# Patient Record
Sex: FEMALE | Race: BLACK OR AFRICAN AMERICAN | NOT HISPANIC OR LATINO | URBAN - METROPOLITAN AREA
[De-identification: names, ages, dates, MRNs, and addresses within clinical notes are randomized per-mention and may not be internally consistent; named-entity substitution may affect disease eponyms.]

---

## 2020-03-28 ENCOUNTER — INPATIENT (INPATIENT)
Facility: HOSPITAL | Age: 80
LOS: 4 days | Discharge: HOME | DRG: 177 | End: 2020-04-02
Attending: INTERNAL MEDICINE | Admitting: HOSPITALIST
Payer: MEDICARE

## 2020-03-28 VITALS
OXYGEN SATURATION: 99 % | DIASTOLIC BLOOD PRESSURE: 65 MMHG | HEART RATE: 87 BPM | RESPIRATION RATE: 18 BRPM | TEMPERATURE: 100 F | HEIGHT: 63 IN | WEIGHT: 139.99 LBS | SYSTOLIC BLOOD PRESSURE: 95 MMHG

## 2020-03-28 DIAGNOSIS — J18.9 PNEUMONIA, UNSPECIFIED ORGANISM: ICD-10-CM

## 2020-03-28 DIAGNOSIS — Z78.9 OTHER SPECIFIED HEALTH STATUS: Chronic | ICD-10-CM

## 2020-03-28 LAB
ALBUMIN SERPL ELPH-MCNC: 3.7 G/DL — SIGNIFICANT CHANGE UP (ref 3.3–5.2)
ALP SERPL-CCNC: 86 U/L — SIGNIFICANT CHANGE UP (ref 40–120)
ALT FLD-CCNC: 53 U/L — HIGH
ANION GAP SERPL CALC-SCNC: 12 MMOL/L — SIGNIFICANT CHANGE UP (ref 5–17)
APPEARANCE UR: ABNORMAL
APTT BLD: 30.9 SEC — SIGNIFICANT CHANGE UP (ref 27.5–36.3)
AST SERPL-CCNC: 108 U/L — HIGH
BACTERIA # UR AUTO: ABNORMAL
BASOPHILS # BLD AUTO: 0 K/UL — SIGNIFICANT CHANGE UP (ref 0–0.2)
BASOPHILS NFR BLD AUTO: 0 % — SIGNIFICANT CHANGE UP (ref 0–2)
BILIRUB SERPL-MCNC: 0.7 MG/DL — SIGNIFICANT CHANGE UP (ref 0.4–2)
BILIRUB UR-MCNC: NEGATIVE — SIGNIFICANT CHANGE UP
BUN SERPL-MCNC: 26 MG/DL — HIGH (ref 8–20)
CALCIUM SERPL-MCNC: 9.2 MG/DL — SIGNIFICANT CHANGE UP (ref 8.6–10.2)
CHLORIDE SERPL-SCNC: 94 MMOL/L — LOW (ref 98–107)
CO2 SERPL-SCNC: 23 MMOL/L — SIGNIFICANT CHANGE UP (ref 22–29)
COLOR SPEC: YELLOW — SIGNIFICANT CHANGE UP
COMMENT - URINE: SIGNIFICANT CHANGE UP
CREAT SERPL-MCNC: 1.06 MG/DL — SIGNIFICANT CHANGE UP (ref 0.5–1.3)
CRP SERPL-MCNC: 8.85 MG/DL — HIGH (ref 0–0.4)
DACRYOCYTES BLD QL SMEAR: SLIGHT — SIGNIFICANT CHANGE UP
DIFF PNL FLD: ABNORMAL
ELLIPTOCYTES BLD QL SMEAR: SLIGHT — SIGNIFICANT CHANGE UP
EOSINOPHIL # BLD AUTO: 0 K/UL — SIGNIFICANT CHANGE UP (ref 0–0.5)
EOSINOPHIL NFR BLD AUTO: 0 % — SIGNIFICANT CHANGE UP (ref 0–6)
EPI CELLS # UR: SIGNIFICANT CHANGE UP
FERRITIN SERPL-MCNC: 686 NG/ML — HIGH (ref 15–150)
GIANT PLATELETS BLD QL SMEAR: PRESENT — SIGNIFICANT CHANGE UP
GLUCOSE SERPL-MCNC: 104 MG/DL — HIGH (ref 70–99)
GLUCOSE UR QL: NEGATIVE MG/DL — SIGNIFICANT CHANGE UP
GRAN CASTS # UR COMP ASSIST: ABNORMAL /LPF
HCT VFR BLD CALC: 48.7 % — HIGH (ref 34.5–45)
HGB BLD-MCNC: 16.2 G/DL — HIGH (ref 11.5–15.5)
INR BLD: 1.17 RATIO — HIGH (ref 0.88–1.16)
KETONES UR-MCNC: ABNORMAL
LACTATE BLDV-MCNC: 1.8 MMOL/L — SIGNIFICANT CHANGE UP (ref 0.5–2)
LDH SERPL L TO P-CCNC: 494 U/L — HIGH (ref 98–192)
LEUKOCYTE ESTERASE UR-ACNC: ABNORMAL
LYMPHOCYTES # BLD AUTO: 0.4 K/UL — LOW (ref 1–3.3)
LYMPHOCYTES # BLD AUTO: 5.5 % — LOW (ref 13–44)
MANUAL SMEAR VERIFICATION: SIGNIFICANT CHANGE UP
MCHC RBC-ENTMCNC: 30.6 PG — SIGNIFICANT CHANGE UP (ref 27–34)
MCHC RBC-ENTMCNC: 33.3 GM/DL — SIGNIFICANT CHANGE UP (ref 32–36)
MCV RBC AUTO: 92.1 FL — SIGNIFICANT CHANGE UP (ref 80–100)
MONOCYTES # BLD AUTO: 0.6 K/UL — SIGNIFICANT CHANGE UP (ref 0–0.9)
MONOCYTES NFR BLD AUTO: 8.3 % — SIGNIFICANT CHANGE UP (ref 2–14)
NEUTROPHILS # BLD AUTO: 6.24 K/UL — SIGNIFICANT CHANGE UP (ref 1.8–7.4)
NEUTROPHILS NFR BLD AUTO: 83.5 % — HIGH (ref 43–77)
NEUTS BAND # BLD: 2.7 % — SIGNIFICANT CHANGE UP (ref 0–8)
NITRITE UR-MCNC: NEGATIVE — SIGNIFICANT CHANGE UP
PH UR: 6 — SIGNIFICANT CHANGE UP (ref 5–8)
PLAT MORPH BLD: NORMAL — SIGNIFICANT CHANGE UP
PLATELET # BLD AUTO: 180 K/UL — SIGNIFICANT CHANGE UP (ref 150–400)
POTASSIUM SERPL-MCNC: 4.4 MMOL/L — SIGNIFICANT CHANGE UP (ref 3.5–5.3)
POTASSIUM SERPL-SCNC: 4.4 MMOL/L — SIGNIFICANT CHANGE UP (ref 3.5–5.3)
PROT SERPL-MCNC: 8.3 G/DL — SIGNIFICANT CHANGE UP (ref 6.6–8.7)
PROT UR-MCNC: 100 MG/DL
PROTHROM AB SERPL-ACNC: 13.3 SEC — HIGH (ref 10–12.9)
RBC # BLD: 5.29 M/UL — HIGH (ref 3.8–5.2)
RBC # FLD: 13.6 % — SIGNIFICANT CHANGE UP (ref 10.3–14.5)
RBC BLD AUTO: SIGNIFICANT CHANGE UP
RBC CASTS # UR COMP ASSIST: SIGNIFICANT CHANGE UP /HPF (ref 0–4)
SMUDGE CELLS # BLD: PRESENT — SIGNIFICANT CHANGE UP
SODIUM SERPL-SCNC: 129 MMOL/L — LOW (ref 135–145)
SP GR SPEC: 1.02 — SIGNIFICANT CHANGE UP (ref 1.01–1.02)
UROBILINOGEN FLD QL: 1 MG/DL
WBC # BLD: 7.24 K/UL — SIGNIFICANT CHANGE UP (ref 3.8–10.5)
WBC # FLD AUTO: 7.24 K/UL — SIGNIFICANT CHANGE UP (ref 3.8–10.5)
WBC UR QL: SIGNIFICANT CHANGE UP

## 2020-03-28 PROCEDURE — 93010 ELECTROCARDIOGRAM REPORT: CPT

## 2020-03-28 PROCEDURE — 71045 X-RAY EXAM CHEST 1 VIEW: CPT | Mod: 26

## 2020-03-28 PROCEDURE — 99223 1ST HOSP IP/OBS HIGH 75: CPT

## 2020-03-28 PROCEDURE — 99285 EMERGENCY DEPT VISIT HI MDM: CPT | Mod: GC

## 2020-03-28 RX ORDER — SODIUM CHLORIDE 9 MG/ML
1000 INJECTION INTRAMUSCULAR; INTRAVENOUS; SUBCUTANEOUS ONCE
Refills: 0 | Status: COMPLETED | OUTPATIENT
Start: 2020-03-28 | End: 2020-03-28

## 2020-03-28 RX ORDER — GABAPENTIN 400 MG/1
300 CAPSULE ORAL
Refills: 0 | Status: DISCONTINUED | OUTPATIENT
Start: 2020-03-28 | End: 2020-04-02

## 2020-03-28 RX ORDER — ASCORBIC ACID 60 MG
500 TABLET,CHEWABLE ORAL DAILY
Refills: 0 | Status: DISCONTINUED | OUTPATIENT
Start: 2020-03-28 | End: 2020-04-02

## 2020-03-28 RX ORDER — ACETAMINOPHEN 500 MG
650 TABLET ORAL ONCE
Refills: 0 | Status: COMPLETED | OUTPATIENT
Start: 2020-03-28 | End: 2020-03-28

## 2020-03-28 RX ORDER — ATENOLOL 25 MG/1
25 TABLET ORAL DAILY
Refills: 0 | Status: DISCONTINUED | OUTPATIENT
Start: 2020-03-28 | End: 2020-04-02

## 2020-03-28 RX ORDER — CEFTRIAXONE 500 MG/1
1000 INJECTION, POWDER, FOR SOLUTION INTRAMUSCULAR; INTRAVENOUS ONCE
Refills: 0 | Status: COMPLETED | OUTPATIENT
Start: 2020-03-28 | End: 2020-03-28

## 2020-03-28 RX ORDER — ENOXAPARIN SODIUM 100 MG/ML
40 INJECTION SUBCUTANEOUS EVERY 24 HOURS
Refills: 0 | Status: DISCONTINUED | OUTPATIENT
Start: 2020-03-28 | End: 2020-04-02

## 2020-03-28 RX ORDER — ATORVASTATIN CALCIUM 80 MG/1
80 TABLET, FILM COATED ORAL AT BEDTIME
Refills: 0 | Status: DISCONTINUED | OUTPATIENT
Start: 2020-03-28 | End: 2020-04-02

## 2020-03-28 RX ORDER — AZITHROMYCIN 500 MG/1
500 TABLET, FILM COATED ORAL ONCE
Refills: 0 | Status: COMPLETED | OUTPATIENT
Start: 2020-03-28 | End: 2020-03-28

## 2020-03-28 RX ORDER — CHOLECALCIFEROL (VITAMIN D3) 125 MCG
400 CAPSULE ORAL DAILY
Refills: 0 | Status: DISCONTINUED | OUTPATIENT
Start: 2020-03-28 | End: 2020-04-02

## 2020-03-28 RX ORDER — LEVOTHYROXINE SODIUM 125 MCG
75 TABLET ORAL DAILY
Refills: 0 | Status: DISCONTINUED | OUTPATIENT
Start: 2020-03-28 | End: 2020-04-02

## 2020-03-28 RX ADMIN — CEFTRIAXONE 100 MILLIGRAM(S): 500 INJECTION, POWDER, FOR SOLUTION INTRAMUSCULAR; INTRAVENOUS at 17:03

## 2020-03-28 RX ADMIN — Medication 650 MILLIGRAM(S): at 17:03

## 2020-03-28 RX ADMIN — AZITHROMYCIN 255 MILLIGRAM(S): 500 TABLET, FILM COATED ORAL at 17:36

## 2020-03-28 RX ADMIN — SODIUM CHLORIDE 1000 MILLILITER(S): 9 INJECTION INTRAMUSCULAR; INTRAVENOUS; SUBCUTANEOUS at 17:03

## 2020-03-28 NOTE — H&P ADULT - NSHPROSALLOTHERNEGRD_GEN_ALL_CORE
Phone call to patient, he states \" I know if I schedule something my wife will probably end up rescheduling it. Its best to schedule the procedure with her. You have permission to speak with Chelsie, my wife. I will have her call back tomorrow.\" patient was advised to have her call us to scheduled when she is available.    All other review of systems negative, except as noted in HPI

## 2020-03-28 NOTE — ED ADULT NURSE NOTE - NSIMPLEMENTINTERV_GEN_ALL_ED
Implemented All Fall Risk Interventions:  [place] to call system. Call bell, personal items and telephone within reach. Instruct patient to call for assistance. Room bathroom lighting operational. Non-slip footwear when patient is off stretcher. Physically safe environment: no spills, clutter or unnecessary equipment. Stretcher in lowest position, wheels locked, appropriate side rails in place. Provide visual cue, wrist band, yellow gown, etc. Monitor gait and stability. Monitor for mental status changes and reorient to person, place, and time. Review medications for side effects contributing to fall risk. Reinforce activity limits and safety measures with patient and family.

## 2020-03-28 NOTE — H&P ADULT - NSHPPHYSICALEXAM_GEN_ALL_CORE
T(C): 37.1 (03-28-20 @ 20:27), Max: 37.8 (03-28-20 @ 15:02)  HR: 73 (03-28-20 @ 20:27) (73 - 90)  BP: 123/56 (03-28-20 @ 20:27) (95/65 - 123/56)  RR: 17 (03-28-20 @ 20:27) (17 - 20)  SpO2: 99% (03-28-20 @ 20:27) (98% - 99%)    GENERAL: patient appears well, no acute distress, appropriate, pleasant  EYES: sclera clear, no exudates  ENMT: oropharynx clear without erythema, no exudates, moist mucous membranes  NECK: supple, soft, no thyromegaly noted  LUNGS: good air entry bilaterally, clear to auscultation, symmetric breath sounds, no wheezing or rhonchi appreciated  HEART: soft S1/S2, regular rate and rhythm, no murmurs noted, no lower extremity edema  GASTROINTESTINAL: abdomen is soft, nontender, nondistended, normoactive bowel sounds, no palpable masses  INTEGUMENT: good skin turgor, warm skin, appears well perfused  MUSCULOSKELETAL: no clubbing or cyanosis, no obvious deformity  NEUROLOGIC: awake, alert, oriented x3, good muscle tone in 4 extremities, no obvious sensory deficits  PSYCHIATRIC: mood is good, affect is congruent, linear and logical thought process  HEME/LYMPH: no palpable supraclavicular nodules, no obvious ecchymosis or petechiae

## 2020-03-28 NOTE — ED PROVIDER NOTE - OBJECTIVE STATEMENT
The patient is a 79 year old female presents with SOB, cough and fever and depressed mental status  No HA, No CP, No abd pain

## 2020-03-28 NOTE — ED PROVIDER NOTE - PROGRESS NOTE DETAILS
Stuart SOTO: Additional info obtained via phone from pt's grandson Pritesh.  He notes about 1 week of flu-like symptoms with fever and cough.  Over the past 3 days, pt has seemed more confused and less talkative.  When he visited today, she seemed lethargic.  He checked her SpO2 and found it to be 90% on RA when lying down, improved to 94-96% when sitting up.  Had temp of 100.1 at home.  Pt is normally very functional and independent.

## 2020-03-28 NOTE — H&P ADULT - ASSESSMENT
80yo F with pmh of HTN, HLD, Hypothyroidism presented to ED c/o generalized weakness with associated  Po intake, cough and sob for the past 1 week. Pt states she feel very weak, unable to ambulated and has decreased appetite and she got worse over the past few days, then her family dropped her off to ED today. On my encounter patient is AAOx3, able to answer all my questions, sating 96% on RA. Denies cp, sob, palpitations, n/v/d, abdominal pain. In the ED with low grade fever, transamititis, Na 129,Ferritin 686, CRP8.85, , lactate 1.8, cxr with no significant gross finding, no hypoxia.Pt received zithromax, ceftriaxone and 1L NS. 78yo F with pmh of HTN, HLD, Hypothyroidism presented to ED c/o generalized weakness with associated  Po intake, cough and sob for the past 1 week. Pt states she feel very weak, unable to ambulated and has decreased appetite and she got worse over the past few days, then her family dropped her off to ED today. On my encounter patient is AAOx3, able to answer all my questions, sating 96% on RA. Denies cp, sob, palpitations, n/v/d, abdominal pain. In the ED with low grade fever, transamititis, Na 129,Ferritin 686, CRP8.85, , lactate 1.8, cxr with no significant gross finding, no hypoxia. Pt received Zithromax, ceftriaxone and 1L NS.       Generalized weakness with fever, cough and sob r/o COVID-19  -Pending COVID result   -Admit to medical unit Monitor with   -Supplemental oxygen as needed to keep Sao2>94%  -Airborne and contact isolation  -f/u blood and urine culture  -Azithromycin and Rocephin IVPB stat does given in ED.   -Albuterol INH   -Consider ID Consult  -Please eval EKG for QTC  -F/U CBC, CMP  -started on Vit C, D    Hyponatremia  -likely due to dehydration   -s/p 1L NS int the ED, will hold off fluid for now given COVID r/o   -Hold HCTZ  -f/u am BMP     Hypertension  -labile bp   -cont with atenolol with holding parameter    HLD  -cont with atorvastatin    hypothyroidism  -cont with synthroid       DVT PPx  -cont with lovenox

## 2020-03-28 NOTE — ED PROVIDER NOTE - ATTENDING CONTRIBUTION TO CARE
The patient seen and examined    Pneumonia  Most likely COVID-19    I, Braden Beaver, performed the initial face to face bedside interview with this patient regarding history of present illness, review of symptoms and relevant past medical, social and family history.  I completed an independent physical examination.  I was the initial provider who evaluated this patient. I have signed out the follow up of any pending tests (i.e. labs, radiological studies) to the resident.  I have communicated the patient’s plan of care and disposition with the resident The patient seen and examined    Pneumonia  Most likely COVID-19    I, Braden Beaver, performed the initial face to face bedside interview with this patient regarding history of present illness, review of symptoms and relevant past medical, social and family history.  I completed an independent physical examination.  I was the initial provider who evaluated this patient. I have signed out the follow up of any pending tests (i.e. labs, radiological studies) to the resident.  I have communicated the patient’s plan of care and disposition with the resident.

## 2020-03-28 NOTE — ED ADULT TRIAGE NOTE - CHIEF COMPLAINT QUOTE
pt c/o fever, sob that started Monday and worsened, pt A&Ox2 oriented to self and placed as per grandson not at baseline pt c/o fever, sob, ams, and lethargic  that started Monday and worsened, pt A&Ox2 oriented to self and placed as per grandson not at baseline

## 2020-03-28 NOTE — H&P ADULT - HISTORY OF PRESENT ILLNESS
80yo F with pmh of HTN, HLD, Hypothyroidism presented to ED c/o generalized weakness with associated  Po intake, cough and sob for the past 1 week. Pt states she feel very weak, unable to ambulated and has decreased appetite and she got worse over the past few days, then her family dropped her off to ED today. On my encounter patient is AAOx3, able to answer all my questions, sating 96% on RA. Denies cp, sob, palpitations, n/v/d, abdominal pain. In the ED with low grade fever, transamititis, Na 129,Ferritin 686, CRP8.85, , lactate 1.8, cxr with no significant gross finding, no hypoxia.Pt received zithromax, ceftriaxone and 1L NS.

## 2020-03-28 NOTE — ED ADULT NURSE NOTE - CHIEF COMPLAINT QUOTE
pt c/o fever, sob, ams, and lethargic  that started Monday and worsened, pt A&Ox2 oriented to self and placed as per grandson not at baseline

## 2020-03-28 NOTE — ED PROVIDER NOTE - CLINICAL SUMMARY MEDICAL DECISION MAKING FREE TEXT BOX
The patient presents with cough, SOB and fever and generalized weakness and will admit for further evaluation

## 2020-03-28 NOTE — ED ADULT NURSE NOTE - OBJECTIVE STATEMENT
78yo female a&ox2 states "not feeling well" cannot elaborate more. states "sometimes I feel SOB" pt denies any fever, chills, cp, cough/congestion, n/v/d, urinary symptoms, recent sick contact or travel. no BLE edema

## 2020-03-29 LAB
ALBUMIN SERPL ELPH-MCNC: 3.4 G/DL — SIGNIFICANT CHANGE UP (ref 3.3–5.2)
ALP SERPL-CCNC: 80 U/L — SIGNIFICANT CHANGE UP (ref 40–120)
ALT FLD-CCNC: 52 U/L — HIGH
ANION GAP SERPL CALC-SCNC: 14 MMOL/L — SIGNIFICANT CHANGE UP (ref 5–17)
AST SERPL-CCNC: 99 U/L — HIGH
BASOPHILS # BLD AUTO: 0.01 K/UL — SIGNIFICANT CHANGE UP (ref 0–0.2)
BASOPHILS NFR BLD AUTO: 0.2 % — SIGNIFICANT CHANGE UP (ref 0–2)
BILIRUB DIRECT SERPL-MCNC: 0.2 MG/DL — SIGNIFICANT CHANGE UP (ref 0–0.3)
BILIRUB INDIRECT FLD-MCNC: 0.3 MG/DL — SIGNIFICANT CHANGE UP (ref 0.2–1)
BILIRUB SERPL-MCNC: 0.5 MG/DL — SIGNIFICANT CHANGE UP (ref 0.4–2)
BUN SERPL-MCNC: 17 MG/DL — SIGNIFICANT CHANGE UP (ref 8–20)
CALCIUM SERPL-MCNC: 8.9 MG/DL — SIGNIFICANT CHANGE UP (ref 8.6–10.2)
CHLORIDE SERPL-SCNC: 93 MMOL/L — LOW (ref 98–107)
CO2 SERPL-SCNC: 24 MMOL/L — SIGNIFICANT CHANGE UP (ref 22–29)
CREAT SERPL-MCNC: 0.88 MG/DL — SIGNIFICANT CHANGE UP (ref 0.5–1.3)
EOSINOPHIL # BLD AUTO: 0 K/UL — SIGNIFICANT CHANGE UP (ref 0–0.5)
EOSINOPHIL NFR BLD AUTO: 0 % — SIGNIFICANT CHANGE UP (ref 0–6)
GLUCOSE SERPL-MCNC: 86 MG/DL — SIGNIFICANT CHANGE UP (ref 70–99)
HCT VFR BLD CALC: 44.9 % — SIGNIFICANT CHANGE UP (ref 34.5–45)
HGB BLD-MCNC: 14.9 G/DL — SIGNIFICANT CHANGE UP (ref 11.5–15.5)
IMM GRANULOCYTES NFR BLD AUTO: 1 % — SIGNIFICANT CHANGE UP (ref 0–1.5)
LYMPHOCYTES # BLD AUTO: 0.89 K/UL — LOW (ref 1–3.3)
LYMPHOCYTES # BLD AUTO: 14.3 % — SIGNIFICANT CHANGE UP (ref 13–44)
MAGNESIUM SERPL-MCNC: 2.5 MG/DL — SIGNIFICANT CHANGE UP (ref 1.6–2.6)
MCHC RBC-ENTMCNC: 30.9 PG — SIGNIFICANT CHANGE UP (ref 27–34)
MCHC RBC-ENTMCNC: 33.2 GM/DL — SIGNIFICANT CHANGE UP (ref 32–36)
MCV RBC AUTO: 93.2 FL — SIGNIFICANT CHANGE UP (ref 80–100)
MONOCYTES # BLD AUTO: 0.32 K/UL — SIGNIFICANT CHANGE UP (ref 0–0.9)
MONOCYTES NFR BLD AUTO: 5.1 % — SIGNIFICANT CHANGE UP (ref 2–14)
NEUTROPHILS # BLD AUTO: 4.95 K/UL — SIGNIFICANT CHANGE UP (ref 1.8–7.4)
NEUTROPHILS NFR BLD AUTO: 79.4 % — HIGH (ref 43–77)
PHOSPHATE SERPL-MCNC: 2.4 MG/DL — SIGNIFICANT CHANGE UP (ref 2.4–4.7)
PLATELET # BLD AUTO: 172 K/UL — SIGNIFICANT CHANGE UP (ref 150–400)
POTASSIUM SERPL-MCNC: 4.7 MMOL/L — SIGNIFICANT CHANGE UP (ref 3.5–5.3)
POTASSIUM SERPL-SCNC: 4.7 MMOL/L — SIGNIFICANT CHANGE UP (ref 3.5–5.3)
PROT SERPL-MCNC: 7.2 G/DL — SIGNIFICANT CHANGE UP (ref 6.6–8.7)
RBC # BLD: 4.82 M/UL — SIGNIFICANT CHANGE UP (ref 3.8–5.2)
RBC # FLD: 13.5 % — SIGNIFICANT CHANGE UP (ref 10.3–14.5)
SARS-COV-2 RNA SPEC QL NAA+PROBE: DETECTED
SODIUM SERPL-SCNC: 131 MMOL/L — LOW (ref 135–145)
WBC # BLD: 6.23 K/UL — SIGNIFICANT CHANGE UP (ref 3.8–10.5)
WBC # FLD AUTO: 6.23 K/UL — SIGNIFICANT CHANGE UP (ref 3.8–10.5)

## 2020-03-29 PROCEDURE — 99232 SBSQ HOSP IP/OBS MODERATE 35: CPT

## 2020-03-29 PROCEDURE — 93010 ELECTROCARDIOGRAM REPORT: CPT

## 2020-03-29 RX ORDER — HYDROXYCHLOROQUINE SULFATE 200 MG
TABLET ORAL
Refills: 0 | Status: DISCONTINUED | OUTPATIENT
Start: 2020-03-29 | End: 2020-03-29

## 2020-03-29 RX ORDER — ACETAMINOPHEN 500 MG
650 TABLET ORAL EVERY 6 HOURS
Refills: 0 | Status: DISCONTINUED | OUTPATIENT
Start: 2020-03-29 | End: 2020-04-02

## 2020-03-29 RX ADMIN — GABAPENTIN 300 MILLIGRAM(S): 400 CAPSULE ORAL at 06:47

## 2020-03-29 RX ADMIN — Medication 650 MILLIGRAM(S): at 15:56

## 2020-03-29 RX ADMIN — ATORVASTATIN CALCIUM 80 MILLIGRAM(S): 80 TABLET, FILM COATED ORAL at 21:26

## 2020-03-29 RX ADMIN — ENOXAPARIN SODIUM 40 MILLIGRAM(S): 100 INJECTION SUBCUTANEOUS at 07:10

## 2020-03-29 RX ADMIN — Medication 650 MILLIGRAM(S): at 08:15

## 2020-03-29 RX ADMIN — ATENOLOL 25 MILLIGRAM(S): 25 TABLET ORAL at 06:46

## 2020-03-29 RX ADMIN — Medication 500 MILLIGRAM(S): at 11:56

## 2020-03-29 RX ADMIN — Medication 400 UNIT(S): at 11:56

## 2020-03-29 RX ADMIN — Medication 650 MILLIGRAM(S): at 16:38

## 2020-03-29 RX ADMIN — Medication 75 MICROGRAM(S): at 06:47

## 2020-03-29 RX ADMIN — GABAPENTIN 300 MILLIGRAM(S): 400 CAPSULE ORAL at 18:11

## 2020-03-29 NOTE — PROGRESS NOTE ADULT - SUBJECTIVE AND OBJECTIVE BOX
Patient: ESTELLA FALK [id number] 79y Female                           Internal Medicine Hospitalist Progress Note    Pt still c/o mild fatigue and SOB.  Tm 102.  Not on supplemental O2.   No additional complaints.     ____________________PHYSICAL EXAM:  GENERAL:  NAD Alert and Oriented x 3   HEENT: NCAT  CARDIOVASCULAR:  S1, S2  LUNGS: CTAB  ABDOMEN:  soft, (-) tenderness, (-) distension, (+) bowel sounds, (-) guarding, (-) rebound (-) rigidity  EXTREMITIES:  no cyanosis / clubbing / edema.   ____________________     VITALS:  Vital Signs Last 24 Hrs  T(C): 38.9 (29 Mar 2020 13:24), Max: 38.9 (29 Mar 2020 13:24)  T(F): 102 (29 Mar 2020 13:24), Max: 102 (29 Mar 2020 13:24)  HR: 74 (29 Mar 2020 13:24) (73 - 90)  BP: 137/80 (29 Mar 2020 13:24) (105/55 - 137/80)  BP(mean): --  RR: 18 (29 Mar 2020 13:24) (17 - 20)  SpO2: 99% (29 Mar 2020 13:24) (95% - 99%) Daily     Daily   CAPILLARY BLOOD GLUCOSE        I&O's Summary        BACKGROUND:  HEALTH ISSUES - PROBLEM Dx:        Allergies    No Known Allergies    Intolerances      PAST MEDICAL & SURGICAL HISTORY:  Hypothyroid  HLD (hyperlipidemia)  HTN (hypertension)  No pertinent past surgical history        LABS:                        14.9   6.23  )-----------( 172      ( 29 Mar 2020 11:20 )             44.9         131<L>  |  93<L>  |  17.0  ----------------------------<  86  4.7   |  24.0  |  0.88    Ca    8.9      29 Mar 2020 11:20  Phos  2.4       Mg     2.5         TPro  7.2  /  Alb  3.4  /  TBili  0.5  /  DBili  0.2  /  AST  99<H>  /  ALT  52<H>  /  AlkPhos  80      PT/INR - ( 28 Mar 2020 17:27 )   PT: 13.3 sec;   INR: 1.17 ratio         PTT - ( 28 Mar 2020 17:27 )  PTT:30.9 sec  LIVER FUNCTIONS - ( 29 Mar 2020 11:20 )  Alb: 3.4 g/dL / Pro: 7.2 g/dL / ALK PHOS: 80 U/L / ALT: 52 U/L / AST: 99 U/L / GGT: x           Urinalysis Basic - ( 28 Mar 2020 21:16 )    Color: Yellow / Appearance: Slightly Turbid / S.020 / pH: x  Gluc: x / Ketone: Small  / Bili: Negative / Urobili: 1 mg/dL   Blood: x / Protein: 100 mg/dL / Nitrite: Negative   Leuk Esterase: Trace / RBC: 0-2 /HPF / WBC 3-5   Sq Epi: x / Non Sq Epi: Occasional / Bacteria: Occasional              MEDICATIONS:  MEDICATIONS  (STANDING):  ascorbic acid 500 milliGRAM(s) Oral daily  ATENolol  Tablet 25 milliGRAM(s) Oral daily  atorvastatin 80 milliGRAM(s) Oral at bedtime  cholecalciferol 400 Unit(s) Oral daily  enoxaparin Injectable 40 milliGRAM(s) SubCutaneous every 24 hours  gabapentin 300 milliGRAM(s) Oral two times a day  levothyroxine 75 MICROGram(s) Oral daily    MEDICATIONS  (PRN):  acetaminophen   Tablet .. 650 milliGRAM(s) Oral every 6 hours PRN Temp greater or equal to 38.5C (101.3F)

## 2020-03-29 NOTE — PROGRESS NOTE ADULT - ASSESSMENT
78yo F with pmh of HTN, HLD, Hypothyroidism presented to ED c/o generalized weakness with associated  Po intake, cough and sob for the past 1 week. Pt states she feel very weak, unable to ambulated and has decreased appetite and she got worse over the past few days, then her family dropped her off to ED.  COVID positive.      # Viral Pneumonia, Confirmed Covid-19 infection - Airborne precautions.  Symptom management.  Albuterol HFA, antitussives.  Tylenol prn fever.  Supplemental O2.  Monitor SaO2.  Discussed trial of prone positioning.  Avoid nebulizers / high flow O2.  In this unprecedented setting of limited ICU availability, observe hypoxia with continuous SaO2.  If hypoxia significantly worsens despite 100% FIO2, would consider ICU evaluation.    Discussed with patient to refer close contacts and CDC guidelines regarding self-quarantine and isolation precautions.  Not hypoxic.  Will monitor off Plaquenil for now.  # Hyponatremia - appears to be hypovolemic.  S/p IVF.  repeat BMp in am.  # Essential HTN - Monitor BP.  Continue oral antihypertensives.  # Hyperlipidemia - diet modification.  Continue Statin.  # Acquired Hypothyroidism - continue Synthroid.  # DVT Prophylaxis - Lovenox subcut

## 2020-03-30 LAB
ALBUMIN SERPL ELPH-MCNC: 2.9 G/DL — LOW (ref 3.3–5.2)
ALP SERPL-CCNC: 80 U/L — SIGNIFICANT CHANGE UP (ref 40–120)
ALT FLD-CCNC: 55 U/L — HIGH
ANION GAP SERPL CALC-SCNC: 15 MMOL/L — SIGNIFICANT CHANGE UP (ref 5–17)
AST SERPL-CCNC: 107 U/L — HIGH
BASOPHILS # BLD AUTO: 0.01 K/UL — SIGNIFICANT CHANGE UP (ref 0–0.2)
BASOPHILS NFR BLD AUTO: 0.1 % — SIGNIFICANT CHANGE UP (ref 0–2)
BILIRUB SERPL-MCNC: 0.6 MG/DL — SIGNIFICANT CHANGE UP (ref 0.4–2)
BUN SERPL-MCNC: 16 MG/DL — SIGNIFICANT CHANGE UP (ref 8–20)
CALCIUM SERPL-MCNC: 8.8 MG/DL — SIGNIFICANT CHANGE UP (ref 8.6–10.2)
CHLORIDE SERPL-SCNC: 96 MMOL/L — LOW (ref 98–107)
CO2 SERPL-SCNC: 21 MMOL/L — LOW (ref 22–29)
CREAT SERPL-MCNC: 0.66 MG/DL — SIGNIFICANT CHANGE UP (ref 0.5–1.3)
CULTURE RESULTS: SIGNIFICANT CHANGE UP
EOSINOPHIL # BLD AUTO: 0.01 K/UL — SIGNIFICANT CHANGE UP (ref 0–0.5)
EOSINOPHIL NFR BLD AUTO: 0.1 % — SIGNIFICANT CHANGE UP (ref 0–6)
GLUCOSE SERPL-MCNC: 96 MG/DL — SIGNIFICANT CHANGE UP (ref 70–99)
HCT VFR BLD CALC: 48.7 % — HIGH (ref 34.5–45)
HGB BLD-MCNC: 15.9 G/DL — HIGH (ref 11.5–15.5)
IMM GRANULOCYTES NFR BLD AUTO: 1 % — SIGNIFICANT CHANGE UP (ref 0–1.5)
LYMPHOCYTES # BLD AUTO: 1.23 K/UL — SIGNIFICANT CHANGE UP (ref 1–3.3)
LYMPHOCYTES # BLD AUTO: 17.2 % — SIGNIFICANT CHANGE UP (ref 13–44)
MCHC RBC-ENTMCNC: 31.1 PG — SIGNIFICANT CHANGE UP (ref 27–34)
MCHC RBC-ENTMCNC: 32.6 GM/DL — SIGNIFICANT CHANGE UP (ref 32–36)
MCV RBC AUTO: 95.1 FL — SIGNIFICANT CHANGE UP (ref 80–100)
MONOCYTES # BLD AUTO: 0.42 K/UL — SIGNIFICANT CHANGE UP (ref 0–0.9)
MONOCYTES NFR BLD AUTO: 5.9 % — SIGNIFICANT CHANGE UP (ref 2–14)
NEUTROPHILS # BLD AUTO: 5.4 K/UL — SIGNIFICANT CHANGE UP (ref 1.8–7.4)
NEUTROPHILS NFR BLD AUTO: 75.7 % — SIGNIFICANT CHANGE UP (ref 43–77)
PLATELET # BLD AUTO: 162 K/UL — SIGNIFICANT CHANGE UP (ref 150–400)
POTASSIUM SERPL-MCNC: 5.1 MMOL/L — SIGNIFICANT CHANGE UP (ref 3.5–5.3)
POTASSIUM SERPL-SCNC: 5.1 MMOL/L — SIGNIFICANT CHANGE UP (ref 3.5–5.3)
PROT SERPL-MCNC: 7.4 G/DL — SIGNIFICANT CHANGE UP (ref 6.6–8.7)
RBC # BLD: 5.12 M/UL — SIGNIFICANT CHANGE UP (ref 3.8–5.2)
RBC # FLD: 13.7 % — SIGNIFICANT CHANGE UP (ref 10.3–14.5)
SODIUM SERPL-SCNC: 132 MMOL/L — LOW (ref 135–145)
SPECIMEN SOURCE: SIGNIFICANT CHANGE UP
WBC # BLD: 7.14 K/UL — SIGNIFICANT CHANGE UP (ref 3.8–10.5)
WBC # FLD AUTO: 7.14 K/UL — SIGNIFICANT CHANGE UP (ref 3.8–10.5)

## 2020-03-30 PROCEDURE — 99232 SBSQ HOSP IP/OBS MODERATE 35: CPT

## 2020-03-30 RX ORDER — GABAPENTIN 400 MG/1
1 CAPSULE ORAL
Qty: 0 | Refills: 0 | DISCHARGE

## 2020-03-30 RX ORDER — OMEPRAZOLE 10 MG/1
1 CAPSULE, DELAYED RELEASE ORAL
Qty: 0 | Refills: 0 | DISCHARGE

## 2020-03-30 RX ORDER — HYDROXYCHLOROQUINE SULFATE 200 MG
TABLET ORAL
Refills: 0 | Status: DISCONTINUED | OUTPATIENT
Start: 2020-03-30 | End: 2020-04-02

## 2020-03-30 RX ORDER — LEVOTHYROXINE SODIUM 125 MCG
1 TABLET ORAL
Qty: 0 | Refills: 0 | DISCHARGE

## 2020-03-30 RX ORDER — FOSINOPRIL SODIUM 10 MG/1
1 TABLET ORAL
Qty: 0 | Refills: 0 | DISCHARGE

## 2020-03-30 RX ORDER — HYDROXYCHLOROQUINE SULFATE 200 MG
400 TABLET ORAL EVERY 12 HOURS
Refills: 0 | Status: COMPLETED | OUTPATIENT
Start: 2020-03-30 | End: 2020-03-31

## 2020-03-30 RX ORDER — HYDROXYCHLOROQUINE SULFATE 200 MG
200 TABLET ORAL EVERY 12 HOURS
Refills: 0 | Status: DISCONTINUED | OUTPATIENT
Start: 2020-03-31 | End: 2020-04-02

## 2020-03-30 RX ORDER — ATENOLOL 25 MG/1
1 TABLET ORAL
Qty: 0 | Refills: 0 | DISCHARGE

## 2020-03-30 RX ORDER — ATORVASTATIN CALCIUM 80 MG/1
1 TABLET, FILM COATED ORAL
Qty: 0 | Refills: 0 | DISCHARGE

## 2020-03-30 RX ADMIN — Medication 400 UNIT(S): at 11:37

## 2020-03-30 RX ADMIN — GABAPENTIN 300 MILLIGRAM(S): 400 CAPSULE ORAL at 18:01

## 2020-03-30 RX ADMIN — ENOXAPARIN SODIUM 40 MILLIGRAM(S): 100 INJECTION SUBCUTANEOUS at 05:23

## 2020-03-30 RX ADMIN — ATORVASTATIN CALCIUM 80 MILLIGRAM(S): 80 TABLET, FILM COATED ORAL at 22:48

## 2020-03-30 RX ADMIN — Medication 500 MILLIGRAM(S): at 11:37

## 2020-03-30 RX ADMIN — Medication 400 MILLIGRAM(S): at 18:01

## 2020-03-30 RX ADMIN — Medication 75 MICROGRAM(S): at 05:23

## 2020-03-30 RX ADMIN — GABAPENTIN 300 MILLIGRAM(S): 400 CAPSULE ORAL at 05:24

## 2020-03-30 NOTE — PROGRESS NOTE ADULT - ASSESSMENT
78yo F with pmh of HTN, HLD, Hypothyroidism presented to ED c/o generalized weakness with associated  Po intake, cough and sob for the past 1 week. Pt states she feel very weak, unable to ambulated and has decreased appetite and she got worse over the past few days, then her family dropped her off to ED today. On my encounter patient is AAOx3, able to answer all my questions, sating 96% on RA. Denies cp, sob, palpitations, n/v/d, abdominal pain. In the ED with low grade fever, transamititis, Na 129,Ferritin 686, CRP8.85, , lactate 1.8, cxr with no significant gross finding, no hypoxia. Pt received Zithromax, ceftriaxone and 1L NS.  COVID positive      # Viral Pneumonia,   Covid-19 infection - Airborne precautions.  Symptom management.  Albuterol HFA, antitussives.  Tylenol prn fever.  Supplemental O2.  Monitor SaO2.  Trial of prone positioning.  Avoid nebulizers / high flow O2.  In this unprecedented setting of extremely limited ICU availability, observe hypoxia on continuous SaO2.  If pt develops SaO2 < 90% even on 100% FIO2, would consider ICU evaluation.   - Discussed with patient to refer close contacts and CDC guidelines regarding self-quarantine and isolation precautions.  - Plaquenil  # Acute Hypoxic Respiratory Failure - see above.   # Hyponatremia -likely due to dehydration.  S/p IVF.  Tolerating po intake.  Na improving.  Repeat BMP in am.  # Hypertension -labile bp  -cont with atenolol with holding parameter  # HLD -cont with atorvastatin  # Hypothyroidism -cont with synthroid     DVT PPx -cont with lovenox 80yo F with pmh of HTN, HLD, Hypothyroidism presented to ED c/o generalized weakness with associated  Po intake, cough and sob for the past 1 week. Pt states she feel very weak, unable to ambulated and has decreased appetite and she got worse over the past few days, then her family dropped her off to ED today. On my encounter patient is AAOx3, able to answer all my questions, sating 96% on RA. Denies cp, sob, palpitations, n/v/d, abdominal pain. In the ED with low grade fever, transamititis, Na 129,Ferritin 686, CRP8.85, , lactate 1.8, cxr with no significant gross finding, no hypoxia. Pt received Zithromax, ceftriaxone and 1L NS.  COVID positive      # Viral Pneumonia,   Covid-19 infection - Airborne precautions.  Symptom management.  Albuterol HFA, antitussives.  Tylenol prn fever.  Supplemental O2.  Monitor SaO2.  Trial of prone positioning.  Avoid nebulizers / high flow O2.  In this unprecedented setting of extremely limited ICU availability, observe hypoxia on continuous SaO2.  If pt develops SaO2 < 90% even on 100% FIO2, would consider ICU evaluation.   - Discussed with patient to refer close contacts and CDC guidelines regarding self-quarantine and isolation precautions.  - Plaquenil - will need to monitor QTc. 	  # Acute Hypoxic Respiratory Failure - see above.   # Hyponatremia -likely due to dehydration.  S/p IVF.  Tolerating po intake.  Na improving.  Repeat BMP in am.  # Hypertension -labile bp  -cont with atenolol with holding parameter  # HLD -cont with atorvastatin  # Hypothyroidism -cont with synthroid     DVT PPx -cont with lovenox

## 2020-03-30 NOTE — PROGRESS NOTE ADULT - SUBJECTIVE AND OBJECTIVE BOX
Patient: ESTELLA FALK [id number] 79y Female                           Internal Medicine Hospitalist Progress Note    Reports SOB appears to be unchanged.  On NC.  Tm 99.5.  Mild cough, fatigue.    ____________________PHYSICAL EXAM:  GENERAL:  NAD Alert and Oriented x 3   HEENT: NCAT  CARDIOVASCULAR:  S1, S2  LUNGS: coarse BS b/l  ABDOMEN:  soft, (-) tenderness, (-) distension, (+) bowel sounds, (-) guarding, (-) rebound (-) rigidity  EXTREMITIES:  no cyanosis / clubbing / edema.   ____________________     VITALS:  Vital Signs Last 24 Hrs  T(C): 36.9 (30 Mar 2020 08:00), Max: 37.5 (29 Mar 2020 23:33)  T(F): 98.4 (30 Mar 2020 08:00), Max: 99.5 (29 Mar 2020 23:33)  HR: 72 (30 Mar 2020 08:00) (67 - 107)  BP: 112/64 (30 Mar 2020 08:00) (102/69 - 132/68)  BP(mean): --  RR: 14 (30 Mar 2020 08:00) (14 - 18)  SpO2: 94% (30 Mar 2020 08:00) (90% - 97%) Daily     Daily   CAPILLARY BLOOD GLUCOSE        I&O's Summary    29 Mar 2020 07:01  -  30 Mar 2020 07:00  --------------------------------------------------------  IN: 120 mL / OUT: 0 mL / NET: 120 mL          BACKGROUND:  HEALTH ISSUES - PROBLEM Dx:        Allergies    No Known Allergies    Intolerances      PAST MEDICAL & SURGICAL HISTORY:  Hypothyroid  HLD (hyperlipidemia)  HTN (hypertension)  No pertinent past surgical history        LABS:                        15.9   7.14  )-----------( 162      ( 30 Mar 2020 05:55 )             48.7       Culture - Urine (collected 29 Mar 2020 02:03)  Source: .Urine  Final Report (30 Mar 2020 09:28):    <10,000 CFU/mL Normal Urogenital Marcella    03-30    132<L>  |  96<L>  |  16.0  ----------------------------<  96  5.1   |  21.0<L>  |  0.66    Ca    8.8      30 Mar 2020 05:55  Phos  2.4     03-  Mg     2.5         TPro  7.4  /  Alb  2.9<L>  /  TBili  0.6  /  DBili  x   /  AST  107<H>  /  ALT  55<H>  /  AlkPhos  80  03-30    PT/INR - ( 28 Mar 2020 17:27 )   PT: 13.3 sec;   INR: 1.17 ratio         PTT - ( 28 Mar 2020 17:27 )  PTT:30.9 sec  LIVER FUNCTIONS - ( 30 Mar 2020 05:55 )  Alb: 2.9 g/dL / Pro: 7.4 g/dL / ALK PHOS: 80 U/L / ALT: 55 U/L / AST: 107 U/L / GGT: x           Urinalysis Basic - ( 28 Mar 2020 21:16 )    Color: Yellow / Appearance: Slightly Turbid / S.020 / pH: x  Gluc: x / Ketone: Small  / Bili: Negative / Urobili: 1 mg/dL   Blood: x / Protein: 100 mg/dL / Nitrite: Negative   Leuk Esterase: Trace / RBC: 0-2 /HPF / WBC 3-5   Sq Epi: x / Non Sq Epi: Occasional / Bacteria: Occasional          Culture - Urine (collected 29 Mar 2020 02:03)  Source: .Urine  Final Report (30 Mar 2020 09:28):    <10,000 CFU/mL Normal Urogenital Marcella          MEDICATIONS:  MEDICATIONS  (STANDING):  ascorbic acid 500 milliGRAM(s) Oral daily  ATENolol  Tablet 25 milliGRAM(s) Oral daily  atorvastatin 80 milliGRAM(s) Oral at bedtime  cholecalciferol 400 Unit(s) Oral daily  enoxaparin Injectable 40 milliGRAM(s) SubCutaneous every 24 hours  gabapentin 300 milliGRAM(s) Oral two times a day  levothyroxine 75 MICROGram(s) Oral daily    MEDICATIONS  (PRN):  acetaminophen   Tablet .. 650 milliGRAM(s) Oral every 6 hours PRN Temp greater or equal to 38.5C (101.3F)

## 2020-03-31 LAB
ALBUMIN SERPL ELPH-MCNC: 3.1 G/DL — LOW (ref 3.3–5.2)
ALP SERPL-CCNC: 98 U/L — SIGNIFICANT CHANGE UP (ref 40–120)
ALT FLD-CCNC: 72 U/L — HIGH
ANION GAP SERPL CALC-SCNC: 15 MMOL/L — SIGNIFICANT CHANGE UP (ref 5–17)
AST SERPL-CCNC: 102 U/L — HIGH
BASOPHILS # BLD AUTO: 0.01 K/UL — SIGNIFICANT CHANGE UP (ref 0–0.2)
BASOPHILS NFR BLD AUTO: 0.1 % — SIGNIFICANT CHANGE UP (ref 0–2)
BILIRUB SERPL-MCNC: 0.7 MG/DL — SIGNIFICANT CHANGE UP (ref 0.4–2)
BUN SERPL-MCNC: 13 MG/DL — SIGNIFICANT CHANGE UP (ref 8–20)
CALCIUM SERPL-MCNC: 9.1 MG/DL — SIGNIFICANT CHANGE UP (ref 8.6–10.2)
CHLORIDE SERPL-SCNC: 95 MMOL/L — LOW (ref 98–107)
CO2 SERPL-SCNC: 24 MMOL/L — SIGNIFICANT CHANGE UP (ref 22–29)
CREAT SERPL-MCNC: 0.68 MG/DL — SIGNIFICANT CHANGE UP (ref 0.5–1.3)
EOSINOPHIL # BLD AUTO: 0.01 K/UL — SIGNIFICANT CHANGE UP (ref 0–0.5)
EOSINOPHIL NFR BLD AUTO: 0.1 % — SIGNIFICANT CHANGE UP (ref 0–6)
GLUCOSE SERPL-MCNC: 112 MG/DL — HIGH (ref 70–99)
HCT VFR BLD CALC: 45.3 % — HIGH (ref 34.5–45)
HGB BLD-MCNC: 14.9 G/DL — SIGNIFICANT CHANGE UP (ref 11.5–15.5)
IMM GRANULOCYTES NFR BLD AUTO: 1.2 % — SIGNIFICANT CHANGE UP (ref 0–1.5)
LYMPHOCYTES # BLD AUTO: 1.13 K/UL — SIGNIFICANT CHANGE UP (ref 1–3.3)
LYMPHOCYTES # BLD AUTO: 15.2 % — SIGNIFICANT CHANGE UP (ref 13–44)
MCHC RBC-ENTMCNC: 30.8 PG — SIGNIFICANT CHANGE UP (ref 27–34)
MCHC RBC-ENTMCNC: 32.9 GM/DL — SIGNIFICANT CHANGE UP (ref 32–36)
MCV RBC AUTO: 93.6 FL — SIGNIFICANT CHANGE UP (ref 80–100)
MONOCYTES # BLD AUTO: 0.3 K/UL — SIGNIFICANT CHANGE UP (ref 0–0.9)
MONOCYTES NFR BLD AUTO: 4 % — SIGNIFICANT CHANGE UP (ref 2–14)
NEUTROPHILS # BLD AUTO: 5.91 K/UL — SIGNIFICANT CHANGE UP (ref 1.8–7.4)
NEUTROPHILS NFR BLD AUTO: 79.4 % — HIGH (ref 43–77)
PLATELET # BLD AUTO: 277 K/UL — SIGNIFICANT CHANGE UP (ref 150–400)
POTASSIUM SERPL-MCNC: 4.1 MMOL/L — SIGNIFICANT CHANGE UP (ref 3.5–5.3)
POTASSIUM SERPL-SCNC: 4.1 MMOL/L — SIGNIFICANT CHANGE UP (ref 3.5–5.3)
PROT SERPL-MCNC: 7.5 G/DL — SIGNIFICANT CHANGE UP (ref 6.6–8.7)
RBC # BLD: 4.84 M/UL — SIGNIFICANT CHANGE UP (ref 3.8–5.2)
RBC # FLD: 13.7 % — SIGNIFICANT CHANGE UP (ref 10.3–14.5)
SODIUM SERPL-SCNC: 134 MMOL/L — LOW (ref 135–145)
WBC # BLD: 7.45 K/UL — SIGNIFICANT CHANGE UP (ref 3.8–10.5)
WBC # FLD AUTO: 7.45 K/UL — SIGNIFICANT CHANGE UP (ref 3.8–10.5)

## 2020-03-31 PROCEDURE — 99232 SBSQ HOSP IP/OBS MODERATE 35: CPT

## 2020-03-31 RX ADMIN — Medication 400 UNIT(S): at 10:35

## 2020-03-31 RX ADMIN — ATORVASTATIN CALCIUM 80 MILLIGRAM(S): 80 TABLET, FILM COATED ORAL at 22:39

## 2020-03-31 RX ADMIN — Medication 200 MILLIGRAM(S): at 17:25

## 2020-03-31 RX ADMIN — Medication 500 MILLIGRAM(S): at 10:35

## 2020-03-31 RX ADMIN — GABAPENTIN 300 MILLIGRAM(S): 400 CAPSULE ORAL at 05:19

## 2020-03-31 RX ADMIN — ENOXAPARIN SODIUM 40 MILLIGRAM(S): 100 INJECTION SUBCUTANEOUS at 05:19

## 2020-03-31 RX ADMIN — Medication 400 MILLIGRAM(S): at 05:19

## 2020-03-31 RX ADMIN — Medication 75 MICROGRAM(S): at 05:19

## 2020-03-31 NOTE — PROGRESS NOTE ADULT - SUBJECTIVE AND OBJECTIVE BOX
Patient: ESTELLA FALK [id number] 79y Female                           Internal Medicine Hospitalist Progress Note    She continues to c/o SOB.  Remains on O2 via NC  No fevers noted.      ____________________PHYSICAL EXAM:  GENERAL:  NAD Alert and Oriented x 3   HEENT: NCAT  CARDIOVASCULAR:  S1, S2  LUNGS: coarse BS b/l  ABDOMEN:  soft, (-) tenderness, (-) distension, (+) bowel sounds, (-) guarding, (-) rebound (-) rigidity  EXTREMITIES:  no cyanosis / clubbing / edema.   ____________________    VITALS:  Vital Signs Last 24 Hrs  T(C): 36.4 (31 Mar 2020 09:00), Max: 37.2 (31 Mar 2020 04:18)  T(F): 97.6 (31 Mar 2020 09:00), Max: 99 (31 Mar 2020 04:18)  HR: 78 (31 Mar 2020 09:00) (73 - 85)  BP: 116/80 (31 Mar 2020 09:00) (100/68 - 116/80)  BP(mean): --  RR: 19 (31 Mar 2020 09:00) (19 - 24)  SpO2: 97% (31 Mar 2020 09:00) (96% - 99%) Daily     Daily   CAPILLARY BLOOD GLUCOSE        I&O's Summary      LABS:                        14.9   7.45  )-----------( 277      ( 31 Mar 2020 14:39 )             45.3     03-31    134<L>  |  95<L>  |  13.0  ----------------------------<  112<H>  4.1   |  24.0  |  0.68    Ca    9.1      31 Mar 2020 14:39    TPro  7.5  /  Alb  3.1<L>  /  TBili  0.7  /  DBili  x   /  AST  102<H>  /  ALT  72<H>  /  AlkPhos  98  03-31      LIVER FUNCTIONS - ( 31 Mar 2020 14:39 )  Alb: 3.1 g/dL / Pro: 7.5 g/dL / ALK PHOS: 98 U/L / ALT: 72 U/L / AST: 102 U/L / GGT: x                   Culture - Urine (collected 29 Mar 2020 02:03)  Source: .Urine  Final Report (30 Mar 2020 09:28):    <10,000 CFU/mL Normal Urogenital Marcella    Culture - Blood (collected 28 Mar 2020 17:21)  Source: .Blood  Preliminary Report (30 Mar 2020 19:01):    No growth at 48 hours    Culture - Blood (collected 28 Mar 2020 17:21)  Source: .Blood  Preliminary Report (30 Mar 2020 19:01):    No growth at 48 hours        MEDICATIONS:  acetaminophen   Tablet .. 650 milliGRAM(s) Oral every 6 hours PRN  ascorbic acid 500 milliGRAM(s) Oral daily  ATENolol  Tablet 25 milliGRAM(s) Oral daily  atorvastatin 80 milliGRAM(s) Oral at bedtime  cholecalciferol 400 Unit(s) Oral daily  enoxaparin Injectable 40 milliGRAM(s) SubCutaneous every 24 hours  gabapentin 300 milliGRAM(s) Oral two times a day  hydroxychloroquine   Oral   hydroxychloroquine 200 milliGRAM(s) Oral every 12 hours  levothyroxine 75 MICROGram(s) Oral daily

## 2020-03-31 NOTE — PROGRESS NOTE ADULT - ASSESSMENT
78yo F with pmh of HTN, HLD, Hypothyroidism presented to ED c/o generalized weakness with associated  Po intake, cough and sob for the past 1 week. Pt states she feel very weak, unable to ambulated and has decreased appetite and she got worse over the past few days, then her family dropped her off to ED today. On my encounter patient is AAOx3, able to answer all my questions, sating 96% on RA. Denies cp, sob, palpitations, n/v/d, abdominal pain. In the ED with low grade fever, transamititis, Na 129,Ferritin 686, CRP8.85, , lactate 1.8, cxr with no significant gross finding, no hypoxia. Pt received Zithromax, ceftriaxone and 1L NS.  COVID positive      # Viral Pneumonia,   Covid-19 infection - Airborne precautions.  Symptom management.  Albuterol HFA, antitussives.  Tylenol prn fever.  Supplemental O2.  Monitor SaO2.  Trial of prone positioning.  Avoid nebulizers / high flow O2.  In this unprecedented setting of extremely limited ICU availability, observe hypoxia on continuous SaO2.  If pt develops SaO2 < 90% even on 100% FIO2, would consider ICU evaluation.   - Discussed with patient to refer close contacts and CDC guidelines regarding self-quarantine and isolation precautions.  - Plaquenil - will need to monitor QTc. 	  Check SaO2 on RA.   # Acute Hypoxic Respiratory Failure - see above.   # Hyponatremia -likely due to dehydration.  S/p IVF.  Tolerating po intake.  Na improving.  Repeat BMP in am.  # Hypertension -labile bp  -cont with atenolol with holding parameter  # HLD -cont with atorvastatin  # Hypothyroidism -cont with synthroid     DVT PPx -cont with lovenox

## 2020-04-01 LAB
ALBUMIN SERPL ELPH-MCNC: 3 G/DL — LOW (ref 3.3–5.2)
ALP SERPL-CCNC: 102 U/L — SIGNIFICANT CHANGE UP (ref 40–120)
ALT FLD-CCNC: 70 U/L — HIGH
ANION GAP SERPL CALC-SCNC: 13 MMOL/L — SIGNIFICANT CHANGE UP (ref 5–17)
AST SERPL-CCNC: 96 U/L — HIGH
BASOPHILS # BLD AUTO: 0.02 K/UL — SIGNIFICANT CHANGE UP (ref 0–0.2)
BASOPHILS NFR BLD AUTO: 0.3 % — SIGNIFICANT CHANGE UP (ref 0–2)
BILIRUB SERPL-MCNC: 0.7 MG/DL — SIGNIFICANT CHANGE UP (ref 0.4–2)
BUN SERPL-MCNC: 12 MG/DL — SIGNIFICANT CHANGE UP (ref 8–20)
CALCIUM SERPL-MCNC: 9 MG/DL — SIGNIFICANT CHANGE UP (ref 8.6–10.2)
CHLORIDE SERPL-SCNC: 95 MMOL/L — LOW (ref 98–107)
CO2 SERPL-SCNC: 23 MMOL/L — SIGNIFICANT CHANGE UP (ref 22–29)
CREAT SERPL-MCNC: 0.63 MG/DL — SIGNIFICANT CHANGE UP (ref 0.5–1.3)
CRP SERPL-MCNC: 12.57 MG/DL — HIGH (ref 0–0.4)
D DIMER BLD IA.RAPID-MCNC: 431 NG/ML DDU — HIGH
EOSINOPHIL # BLD AUTO: 0.01 K/UL — SIGNIFICANT CHANGE UP (ref 0–0.5)
EOSINOPHIL NFR BLD AUTO: 0.1 % — SIGNIFICANT CHANGE UP (ref 0–6)
FERRITIN SERPL-MCNC: 604 NG/ML — HIGH (ref 15–150)
GLUCOSE SERPL-MCNC: 99 MG/DL — SIGNIFICANT CHANGE UP (ref 70–99)
HCT VFR BLD CALC: 44.4 % — SIGNIFICANT CHANGE UP (ref 34.5–45)
HGB BLD-MCNC: 14.6 G/DL — SIGNIFICANT CHANGE UP (ref 11.5–15.5)
IMM GRANULOCYTES NFR BLD AUTO: 1.2 % — SIGNIFICANT CHANGE UP (ref 0–1.5)
LDH SERPL L TO P-CCNC: 338 U/L — HIGH (ref 98–192)
LYMPHOCYTES # BLD AUTO: 1.21 K/UL — SIGNIFICANT CHANGE UP (ref 1–3.3)
LYMPHOCYTES # BLD AUTO: 16.7 % — SIGNIFICANT CHANGE UP (ref 13–44)
MCHC RBC-ENTMCNC: 30.1 PG — SIGNIFICANT CHANGE UP (ref 27–34)
MCHC RBC-ENTMCNC: 32.9 GM/DL — SIGNIFICANT CHANGE UP (ref 32–36)
MCV RBC AUTO: 91.5 FL — SIGNIFICANT CHANGE UP (ref 80–100)
MONOCYTES # BLD AUTO: 0.34 K/UL — SIGNIFICANT CHANGE UP (ref 0–0.9)
MONOCYTES NFR BLD AUTO: 4.7 % — SIGNIFICANT CHANGE UP (ref 2–14)
NEUTROPHILS # BLD AUTO: 5.58 K/UL — SIGNIFICANT CHANGE UP (ref 1.8–7.4)
NEUTROPHILS NFR BLD AUTO: 77 % — SIGNIFICANT CHANGE UP (ref 43–77)
PLATELET # BLD AUTO: 338 K/UL — SIGNIFICANT CHANGE UP (ref 150–400)
POTASSIUM SERPL-MCNC: 4.4 MMOL/L — SIGNIFICANT CHANGE UP (ref 3.5–5.3)
POTASSIUM SERPL-SCNC: 4.4 MMOL/L — SIGNIFICANT CHANGE UP (ref 3.5–5.3)
PROCALCITONIN SERPL-MCNC: 0.06 NG/ML — SIGNIFICANT CHANGE UP (ref 0.02–0.1)
PROT SERPL-MCNC: 7.4 G/DL — SIGNIFICANT CHANGE UP (ref 6.6–8.7)
RBC # BLD: 4.85 M/UL — SIGNIFICANT CHANGE UP (ref 3.8–5.2)
RBC # FLD: 13.3 % — SIGNIFICANT CHANGE UP (ref 10.3–14.5)
SODIUM SERPL-SCNC: 131 MMOL/L — LOW (ref 135–145)
WBC # BLD: 7.25 K/UL — SIGNIFICANT CHANGE UP (ref 3.8–10.5)
WBC # FLD AUTO: 7.25 K/UL — SIGNIFICANT CHANGE UP (ref 3.8–10.5)

## 2020-04-01 PROCEDURE — 99232 SBSQ HOSP IP/OBS MODERATE 35: CPT

## 2020-04-01 RX ADMIN — Medication 400 UNIT(S): at 10:47

## 2020-04-01 RX ADMIN — GABAPENTIN 300 MILLIGRAM(S): 400 CAPSULE ORAL at 05:35

## 2020-04-01 RX ADMIN — ENOXAPARIN SODIUM 40 MILLIGRAM(S): 100 INJECTION SUBCUTANEOUS at 05:36

## 2020-04-01 RX ADMIN — Medication 200 MILLIGRAM(S): at 17:36

## 2020-04-01 RX ADMIN — GABAPENTIN 300 MILLIGRAM(S): 400 CAPSULE ORAL at 17:36

## 2020-04-01 RX ADMIN — Medication 500 MILLIGRAM(S): at 10:47

## 2020-04-01 RX ADMIN — ATORVASTATIN CALCIUM 80 MILLIGRAM(S): 80 TABLET, FILM COATED ORAL at 23:10

## 2020-04-01 RX ADMIN — Medication 75 MICROGRAM(S): at 05:35

## 2020-04-01 RX ADMIN — Medication 200 MILLIGRAM(S): at 05:35

## 2020-04-01 NOTE — PROGRESS NOTE ADULT - ASSESSMENT
80yo F with pmh of HTN, HLD, Hypothyroidism presented to ED c/o generalized weakness with associated  Po intake, cough and sob for the past 1 week. Pt states she feel very weak, unable to ambulated and has decreased appetite and she got worse over the past few days, then her family dropped her off to ED today. On my encounter patient is AAOx3, able to answer all my questions, sating 96% on RA. Denies cp, sob, palpitations, n/v/d, abdominal pain. In the ED with low grade fever, transamititis, Na 129,Ferritin 686, CRP8.85, , lactate 1.8, cxr with no significant gross finding, no hypoxia. Pt received Zithromax, ceftriaxone and 1L NS.  COVID positive    # Viral Pneumonia,   Covid-19 infection - Airborne precautions.  Symptom management.  Albuterol HFA, antitussives.  Tylenol prn fever.  Supplemental O2.  Monitor SaO2.  Trial of prone positioning.  Avoid nebulizers / high flow O2.  In this unprecedented setting of extremely limited ICU availability, observe hypoxia on continuous SaO2.  If pt develops SaO2 < 90% even on 100% FIO2, would consider ICU evaluation.   - Discussed with patient to refer close contacts and CDC guidelines regarding self-quarantine and isolation precautions.  - Plaquenil - repeat EKG to assess QTc.   Check SaO2 on RA.   # Acute Hypoxic Respiratory Failure - see above.   # Hyponatremia -likely due to dehydration.  S/p IVF.  Tolerating po intake.  Na improving.  Repeat BMP in am.  # Hypertension -labile bp  -cont with atenolol with holding parameter  # HLD -cont with atorvastatin  # Hypothyroidism -cont with synthroid     DVT PPx -cont with lovenox 80yo F with pmh of HTN, HLD, Hypothyroidism presented to ED c/o generalized weakness with associated  Po intake, cough and sob for the past 1 week. Pt states she feel very weak, unable to ambulated and has decreased appetite and she got worse over the past few days, then her family dropped her off to ED today. On my encounter patient is AAOx3, able to answer all my questions, sating 96% on RA. Denies cp, sob, palpitations, n/v/d, abdominal pain. In the ED with low grade fever, transamititis, Na 129,Ferritin 686, CRP8.85, , lactate 1.8, cxr with no significant gross finding, no hypoxia. Pt received Zithromax, ceftriaxone and 1L NS.  COVID positive    # Viral Pneumonia,   Covid-19 infection - Airborne precautions.  Symptom management.  Albuterol HFA, antitussives.  Tylenol prn fever.  Supplemental O2.  Monitor SaO2.  Trial of prone positioning.  Avoid nebulizers / high flow O2.  In this unprecedented setting of extremely limited ICU availability, observe hypoxia on continuous SaO2.  If pt develops SaO2 < 90% even on 100% FIO2, would consider ICU evaluation.   - Discussed with patient to refer close contacts and CDC guidelines regarding self-quarantine and isolation precautions.  - Plaquenil - repeat EKG to assess QTc.   Check SaO2 on RA.   # Acute Hypoxic Respiratory Failure - see above.   # Hyponatremia -likely due to dehydration.  S/p IVF.  Tolerating po intake.  Na improving.  Repeat BMP in am.  # Hypertension -labile bp  -cont with atenolol with holding parameter  # HLD -cont with atorvastatin  # Hypothyroidism -cont with synthroid     DVT PPx -cont with lovenox        Attempted to reach family at [phone] no answer.

## 2020-04-01 NOTE — PROGRESS NOTE ADULT - SUBJECTIVE AND OBJECTIVE BOX
Patient: ESTELLA FALK [id number] 79y Female                           Internal Medicine Hospitalist Progress Note    States SOB is improving.  SaO2 98% on RA.    Remains on O2 via NC  No fevers noted.      ____________________PHYSICAL EXAM:  GENERAL:  NAD Alert and Oriented x 3   HEENT: NCAT  CARDIOVASCULAR:  S1, S2  LUNGS: coarse BS b/l  ABDOMEN:  soft, (-) tenderness, (-) distension, (+) bowel sounds, (-) guarding, (-) rebound (-) rigidity  EXTREMITIES:  no cyanosis / clubbing / edema.   ____________________    VITALS:  Vital Signs Last 24 Hrs  T(C): 36.4 (01 Apr 2020 10:39), Max: 37 (31 Mar 2020 23:06)  T(F): 97.6 (01 Apr 2020 10:39), Max: 98.6 (31 Mar 2020 23:06)  HR: 80 (01 Apr 2020 10:39) (80 - 88)  BP: 101/77 (01 Apr 2020 10:39) (101/68 - 114/72)  BP(mean): --  RR: 19 (01 Apr 2020 10:39) (18 - 20)  SpO2: 100% (01 Apr 2020 10:39) (93% - 100%) Daily     Daily   CAPILLARY BLOOD GLUCOSE        I&O's Summary      LABS:                        14.9   7.45  )-----------( 277      ( 31 Mar 2020 14:39 )             45.3     04-01    131<L>  |  95<L>  |  12.0  ----------------------------<  99  4.4   |  23.0  |  0.63    Ca    9.0      01 Apr 2020 00:48    TPro  7.4  /  Alb  3.0<L>  /  TBili  0.7  /  DBili  x   /  AST  96<H>  /  ALT  70<H>  /  AlkPhos  102  04-01      LIVER FUNCTIONS - ( 01 Apr 2020 00:48 )  Alb: 3.0 g/dL / Pro: 7.4 g/dL / ALK PHOS: 102 U/L / ALT: 70 U/L / AST: 96 U/L / GGT: x                     MEDICATIONS:  acetaminophen   Tablet .. 650 milliGRAM(s) Oral every 6 hours PRN  ascorbic acid 500 milliGRAM(s) Oral daily  ATENolol  Tablet 25 milliGRAM(s) Oral daily  atorvastatin 80 milliGRAM(s) Oral at bedtime  cholecalciferol 400 Unit(s) Oral daily  enoxaparin Injectable 40 milliGRAM(s) SubCutaneous every 24 hours  gabapentin 300 milliGRAM(s) Oral two times a day  hydroxychloroquine   Oral   hydroxychloroquine 200 milliGRAM(s) Oral every 12 hours  levothyroxine 75 MICROGram(s) Oral daily

## 2020-04-02 ENCOUNTER — TRANSCRIPTION ENCOUNTER (OUTPATIENT)
Age: 80
End: 2020-04-02

## 2020-04-02 VITALS
DIASTOLIC BLOOD PRESSURE: 65 MMHG | HEART RATE: 84 BPM | TEMPERATURE: 98 F | SYSTOLIC BLOOD PRESSURE: 92 MMHG | RESPIRATION RATE: 18 BRPM

## 2020-04-02 LAB
CULTURE RESULTS: SIGNIFICANT CHANGE UP
CULTURE RESULTS: SIGNIFICANT CHANGE UP
SPECIMEN SOURCE: SIGNIFICANT CHANGE UP
SPECIMEN SOURCE: SIGNIFICANT CHANGE UP

## 2020-04-02 PROCEDURE — 93005 ELECTROCARDIOGRAM TRACING: CPT

## 2020-04-02 PROCEDURE — 83615 LACTATE (LD) (LDH) ENZYME: CPT

## 2020-04-02 PROCEDURE — 99285 EMERGENCY DEPT VISIT HI MDM: CPT | Mod: 25

## 2020-04-02 PROCEDURE — 99239 HOSP IP/OBS DSCHRG MGMT >30: CPT

## 2020-04-02 PROCEDURE — 84145 PROCALCITONIN (PCT): CPT

## 2020-04-02 PROCEDURE — 84100 ASSAY OF PHOSPHORUS: CPT

## 2020-04-02 PROCEDURE — 85379 FIBRIN DEGRADATION QUANT: CPT

## 2020-04-02 PROCEDURE — 87635 SARS-COV-2 COVID-19 AMP PRB: CPT

## 2020-04-02 PROCEDURE — 71045 X-RAY EXAM CHEST 1 VIEW: CPT

## 2020-04-02 PROCEDURE — 87086 URINE CULTURE/COLONY COUNT: CPT

## 2020-04-02 PROCEDURE — 87040 BLOOD CULTURE FOR BACTERIA: CPT

## 2020-04-02 PROCEDURE — 85730 THROMBOPLASTIN TIME PARTIAL: CPT

## 2020-04-02 PROCEDURE — 80048 BASIC METABOLIC PNL TOTAL CA: CPT

## 2020-04-02 PROCEDURE — 96374 THER/PROPH/DIAG INJ IV PUSH: CPT

## 2020-04-02 PROCEDURE — 86140 C-REACTIVE PROTEIN: CPT

## 2020-04-02 PROCEDURE — 80053 COMPREHEN METABOLIC PANEL: CPT

## 2020-04-02 PROCEDURE — 81001 URINALYSIS AUTO W/SCOPE: CPT

## 2020-04-02 PROCEDURE — 85610 PROTHROMBIN TIME: CPT

## 2020-04-02 PROCEDURE — 80076 HEPATIC FUNCTION PANEL: CPT

## 2020-04-02 PROCEDURE — 83735 ASSAY OF MAGNESIUM: CPT

## 2020-04-02 PROCEDURE — 96375 TX/PRO/DX INJ NEW DRUG ADDON: CPT

## 2020-04-02 PROCEDURE — 85027 COMPLETE CBC AUTOMATED: CPT

## 2020-04-02 PROCEDURE — 36415 COLL VENOUS BLD VENIPUNCTURE: CPT

## 2020-04-02 PROCEDURE — 82728 ASSAY OF FERRITIN: CPT

## 2020-04-02 PROCEDURE — 83605 ASSAY OF LACTIC ACID: CPT

## 2020-04-02 RX ORDER — CHOLECALCIFEROL (VITAMIN D3) 125 MCG
400 CAPSULE ORAL
Qty: 0 | Refills: 0 | DISCHARGE
Start: 2020-04-02

## 2020-04-02 RX ORDER — ASCORBIC ACID 60 MG
1 TABLET,CHEWABLE ORAL
Qty: 0 | Refills: 0 | DISCHARGE
Start: 2020-04-02

## 2020-04-02 RX ADMIN — GABAPENTIN 300 MILLIGRAM(S): 400 CAPSULE ORAL at 06:50

## 2020-04-02 RX ADMIN — Medication 75 MICROGRAM(S): at 06:50

## 2020-04-02 RX ADMIN — ENOXAPARIN SODIUM 40 MILLIGRAM(S): 100 INJECTION SUBCUTANEOUS at 06:50

## 2020-04-02 RX ADMIN — Medication 200 MILLIGRAM(S): at 06:50

## 2020-04-02 NOTE — DISCHARGE NOTE PROVIDER - NSDCFUADDINST_GEN_ALL_CORE_FT
Follow-up with your doctor within 2-3 days. Take Tylenol (Acetaminophen) 650mg every 6 hours as needed for pain. Stay well hydrated. It has been determined that you no longer need hospitalization and can recover while remaining in self-quarantine at home for 14 days. You should follow the prevention steps below until a healthcare provider or [facility] says you can return to normal activities. Please remain in quarantine until  then. You should restrict activities outside your home except for getting medical care. Do not go to work, school, or public areas. Avoid using public transportation. Separate yourself from other people and animals in your home. Cover your cough and sneezes. Clean your hands often. Avoid sharing personal household items. Clean all high touch surfaces. Monitor your symptoms. If you have a medical emergency, call 911. PLEASE FOLLOW ADDITIONAL COVID DISCHARGE PACKET INSTRUCTIONS.

## 2020-04-02 NOTE — PROGRESS NOTE ADULT - SUBJECTIVE AND OBJECTIVE BOX
Patient: ESTELLA FALK [id number] 79y Female                           Internal Medicine Hospitalist Progress Note    She reports feeling well.   SaO2 98% on RA.    Not on O2.   No fevers noted.      ____________________PHYSICAL EXAM:  GENERAL:  NAD Alert and Oriented x 3   HEENT: NCAT  CARDIOVASCULAR:  S1, S2  LUNGS: coarse BS b/l  ABDOMEN:  soft, (-) tenderness, (-) distension, (+) bowel sounds, (-) guarding, (-) rebound (-) rigidity  EXTREMITIES:  no cyanosis / clubbing / edema.   ____________________    VITALS:  Vital Signs Last 24 Hrs  T(C): 36.5 (02 Apr 2020 08:21), Max: 37 (02 Apr 2020 00:20)  T(F): 97.7 (02 Apr 2020 08:21), Max: 98.6 (02 Apr 2020 00:20)  HR: 84 (02 Apr 2020 08:21) (80 - 93)  BP: 92/65 (02 Apr 2020 08:21) (92/65 - 108/74)  BP(mean): --  RR: 18 (02 Apr 2020 08:21) (18 - 20)  SpO2: 98% (02 Apr 2020 04:39) (96% - 100%) Daily     Daily   CAPILLARY BLOOD GLUCOSE        I&O's Summary      LABS:                        14.6   7.25  )-----------( 338      ( 01 Apr 2020 17:47 )             44.4     04-01    131<L>  |  95<L>  |  12.0  ----------------------------<  99  4.4   |  23.0  |  0.63    Ca    9.0      01 Apr 2020 00:48    TPro  7.4  /  Alb  3.0<L>  /  TBili  0.7  /  DBili  x   /  AST  96<H>  /  ALT  70<H>  /  AlkPhos  102  04-01      LIVER FUNCTIONS - ( 01 Apr 2020 00:48 )  Alb: 3.0 g/dL / Pro: 7.4 g/dL / ALK PHOS: 102 U/L / ALT: 70 U/L / AST: 96 U/L / GGT: x                     MEDICATIONS:  acetaminophen   Tablet .. 650 milliGRAM(s) Oral every 6 hours PRN  ascorbic acid 500 milliGRAM(s) Oral daily  ATENolol  Tablet 25 milliGRAM(s) Oral daily  atorvastatin 80 milliGRAM(s) Oral at bedtime  cholecalciferol 400 Unit(s) Oral daily  enoxaparin Injectable 40 milliGRAM(s) SubCutaneous every 24 hours  gabapentin 300 milliGRAM(s) Oral two times a day  hydroxychloroquine   Oral   hydroxychloroquine 200 milliGRAM(s) Oral every 12 hours  levothyroxine 75 MICROGram(s) Oral daily

## 2020-04-02 NOTE — DISCHARGE NOTE PROVIDER - NSDCCPCAREPLAN_GEN_ALL_CORE_FT
PRINCIPAL DISCHARGE DIAGNOSIS  Diagnosis: Coronavirus infection  Assessment and Plan of Treatment:       SECONDARY DISCHARGE DIAGNOSES  Diagnosis: Coronavirus infection  Assessment and Plan of Treatment: PRINCIPAL DISCHARGE DIAGNOSIS  Diagnosis: Coronavirus infection  Assessment and Plan of Treatment:       SECONDARY DISCHARGE DIAGNOSES  Diagnosis: Acute respiratory failure with hypoxia  Assessment and Plan of Treatment:     Diagnosis: Coronavirus infection  Assessment and Plan of Treatment:

## 2020-04-02 NOTE — PROGRESS NOTE ADULT - ASSESSMENT
80yo F with pmh of HTN, HLD, Hypothyroidism presented to ED c/o generalized weakness with associated  Po intake, cough and sob for the past 1 week. Pt states she feel very weak, unable to ambulated and has decreased appetite and she got worse over the past few days, then her family dropped her off to ED today. On my encounter patient is AAOx3, able to answer all my questions, sating 96% on RA. Denies cp, sob, palpitations, n/v/d, abdominal pain. In the ED with low grade fever, transamititis, Na 129,Ferritin 686, CRP8.85, , lactate 1.8, cxr with no significant gross finding, no hypoxia. Pt received Zithromax, ceftriaxone and 1L NS.  COVID positive    # Viral Pneumonia,   Covid-19 infection - Discussed isolation precautions with pt.  Not hypoxic.  Complete Hydroxychloroquine.    # Acute Hypoxic Respiratory Failure - see above.   # Hyponatremia -likely due to dehydration.  S/p IVF.  Tolerating po intake.  Labs stable  # Hypertension -labile bp  -cont with atenolol with holding parameter  # HLD -cont with atorvastatin  # Hypothyroidism -cont with synthroid     DVT PPx -cont with lovenox      Stable for d/c

## 2020-04-02 NOTE — DISCHARGE NOTE NURSING/CASE MANAGEMENT/SOCIAL WORK - PATIENT PORTAL LINK FT
You can access the FollowMyHealth Patient Portal offered by [facility] by registering at the following website: [email or web address]. By joining [employer]’s FollowMyHealth portal, you will also be able to view your health information using other applications (apps) compatible with our system.

## 2020-04-02 NOTE — DISCHARGE NOTE PROVIDER - HOSPITAL COURSE
78yo F with pmh of HTN, HLD, Hypothyroidism presented to ED c/o generalized weakness with associated  Po intake, cough and sob for the past 1 week. Pt states she feel very weak, unable to ambulated and has decreased appetite and she got worse over the past few days, then her family dropped her off to ED on 3/28. Patient tested positive for Covid-19. 78yo F with pmh of HTN, HLD, Hypothyroidism presented to ED c/o generalized weakness with associated  Po intake, cough and sob for the past 1 week. Pt states she feel very weak, unable to ambulated and has decreased appetite and she got worse over the past few days, then her family dropped her off to ED today. On my encounter patient is AAOx3, able to answer all my questions, sating 96% on RA. Denies cp, sob, palpitations, n/v/d, abdominal pain. In the ED with low.  Now feeling much better, not hypoxic.  Complete Hydroxychloroquine.  Discussed self-quarantine per CDC guidelines.    Disposition: Stable for discharge.  Outpatient followup discussed.    Total time spent on discharge is  35  minutes.

## 2020-04-02 NOTE — DISCHARGE NOTE PROVIDER - NSDCFUADDAPPT_GEN_ALL_CORE_FT
It has been determined that you no longer need hospitalization and can recover while remaining in self-quarantine at home for at least 14 days. You should follow the prevention steps below until a healthcare provider or [facility] department says you can return to your normal activities.  Please remain in quarantine until then. You should restrict activities outside your home except for getting medical care.  Do not go to work, school or public areas.  Avoid using public transportation.  Separate yourself from other people and animals in your home.  Cover your coughs and sneezes.  Clean your hands often. Avoid sharing personal household items. Clean all high touch surfaces. Monitor your symptoms, if you have a medical emergency call 911.  Further detailed instructions can be obtained on http://www.cdc.gov  It is important to see your primary physician as well as the physicians noted below within the next week to perform a comprehensive medical review.  Call their offices for an appointment as soon as you leave the hospital.  If you do not have a primary physician, contact the [facility] Physician Referral Service at [phone].  Your medical issues appear to be stable at this time, but if your symptoms recur or worsen, contact your physicians and/or return to the hospital if necessary.  If you encounter any issues or questions with your medication, call your physicians before stopping the medication.  Do not drive.  Limit your diet to 2 grams of sodium daily.

## 2020-04-02 NOTE — DISCHARGE NOTE PROVIDER - NSDCMRMEDTOKEN_GEN_ALL_CORE_FT
atenolol 25 mg oral tablet: 1 tab(s) orally once a day  atorvastatin 80 mg oral tablet: 1 tab(s) orally once a day  fosinopril 40 mg oral tablet: 1 tab(s) orally once a day  gabapentin 300 mg oral capsule: 1 cap(s) orally 3 times a day  hydroCHLOROthiazide 12.5 mg oral capsule: 1 cap(s) orally once a day  levothyroxine 75 mcg (0.075 mg) oral tablet: 1 tab(s) orally once a day  omeprazole 20 mg oral delayed release capsule: 1 cap(s) orally once a day ascorbic acid 500 mg oral tablet: 1 tab(s) orally once a day  atenolol 25 mg oral tablet: 1 tab(s) orally once a day  atorvastatin 80 mg oral tablet: 1 tab(s) orally once a day  cholecalciferol oral tablet: 400 unit(s) orally once a day  fosinopril 40 mg oral tablet: 1 tab(s) orally once a day  gabapentin 300 mg oral capsule: 1 cap(s) orally 3 times a day  hydroCHLOROthiazide 12.5 mg oral capsule: 1 cap(s) orally once a day  levothyroxine 75 mcg (0.075 mg) oral tablet: 1 tab(s) orally once a day  omeprazole 20 mg oral delayed release capsule: 1 cap(s) orally once a day

## 2021-03-26 PROBLEM — E78.5 HYPERLIPIDEMIA, UNSPECIFIED: Chronic | Status: ACTIVE | Noted: 2020-03-28

## 2021-03-26 PROBLEM — I10 ESSENTIAL (PRIMARY) HYPERTENSION: Chronic | Status: ACTIVE | Noted: 2020-03-28

## 2021-03-26 PROBLEM — E03.9 HYPOTHYROIDISM, UNSPECIFIED: Chronic | Status: ACTIVE | Noted: 2020-03-28

## 2021-03-28 ENCOUNTER — APPOINTMENT (OUTPATIENT)
Dept: DISASTER EMERGENCY | Facility: OTHER | Age: 81
End: 2021-03-28
Payer: MEDICARE

## 2021-03-28 PROCEDURE — 0011A: CPT

## 2021-04-25 ENCOUNTER — APPOINTMENT (OUTPATIENT)
Dept: DISASTER EMERGENCY | Facility: OTHER | Age: 81
End: 2021-04-25
Payer: MEDICARE

## 2021-04-25 PROCEDURE — 0012A: CPT

## 2021-06-07 ENCOUNTER — EMERGENCY (EMERGENCY)
Facility: HOSPITAL | Age: 81
LOS: 1 days | Discharge: HOME | End: 2021-06-07
Attending: EMERGENCY MEDICINE
Payer: MEDICARE

## 2021-06-07 VITALS
TEMPERATURE: 98 F | HEIGHT: 63 IN | RESPIRATION RATE: 17 BRPM | DIASTOLIC BLOOD PRESSURE: 87 MMHG | OXYGEN SATURATION: 98 % | SYSTOLIC BLOOD PRESSURE: 148 MMHG | WEIGHT: 139.99 LBS | HEART RATE: 57 BPM

## 2021-06-07 DIAGNOSIS — Z78.9 OTHER SPECIFIED HEALTH STATUS: Chronic | ICD-10-CM

## 2021-06-07 LAB
ALBUMIN SERPL ELPH-MCNC: 4.4 G/DL — SIGNIFICANT CHANGE UP (ref 3.3–5.2)
ALP SERPL-CCNC: 51 U/L — SIGNIFICANT CHANGE UP (ref 40–120)
ALT FLD-CCNC: 17 U/L — SIGNIFICANT CHANGE UP
ANION GAP SERPL CALC-SCNC: 10 MMOL/L — SIGNIFICANT CHANGE UP (ref 5–17)
AST SERPL-CCNC: 30 U/L — SIGNIFICANT CHANGE UP
BASOPHILS # BLD AUTO: 0.04 K/UL — SIGNIFICANT CHANGE UP (ref 0–0.2)
BASOPHILS NFR BLD AUTO: 0.6 % — SIGNIFICANT CHANGE UP (ref 0–2)
BILIRUB SERPL-MCNC: 0.4 MG/DL — SIGNIFICANT CHANGE UP (ref 0.4–2)
BUN SERPL-MCNC: 15.3 MG/DL — SIGNIFICANT CHANGE UP (ref 8–20)
CALCIUM SERPL-MCNC: 9.7 MG/DL — SIGNIFICANT CHANGE UP (ref 8.6–10.2)
CHLORIDE SERPL-SCNC: 105 MMOL/L — SIGNIFICANT CHANGE UP (ref 98–107)
CO2 SERPL-SCNC: 26 MMOL/L — SIGNIFICANT CHANGE UP (ref 22–29)
CREAT SERPL-MCNC: 0.86 MG/DL — SIGNIFICANT CHANGE UP (ref 0.5–1.3)
EOSINOPHIL # BLD AUTO: 0.3 K/UL — SIGNIFICANT CHANGE UP (ref 0–0.5)
EOSINOPHIL NFR BLD AUTO: 4.2 % — SIGNIFICANT CHANGE UP (ref 0–6)
GLUCOSE SERPL-MCNC: 88 MG/DL — SIGNIFICANT CHANGE UP (ref 70–99)
HCT VFR BLD CALC: 39.2 % — SIGNIFICANT CHANGE UP (ref 34.5–45)
HGB BLD-MCNC: 12.7 G/DL — SIGNIFICANT CHANGE UP (ref 11.5–15.5)
IMM GRANULOCYTES NFR BLD AUTO: 0.1 % — SIGNIFICANT CHANGE UP (ref 0–1.5)
LYMPHOCYTES # BLD AUTO: 2.41 K/UL — SIGNIFICANT CHANGE UP (ref 1–3.3)
LYMPHOCYTES # BLD AUTO: 34.1 % — SIGNIFICANT CHANGE UP (ref 13–44)
MCHC RBC-ENTMCNC: 30.5 PG — SIGNIFICANT CHANGE UP (ref 27–34)
MCHC RBC-ENTMCNC: 32.4 GM/DL — SIGNIFICANT CHANGE UP (ref 32–36)
MCV RBC AUTO: 94.2 FL — SIGNIFICANT CHANGE UP (ref 80–100)
MONOCYTES # BLD AUTO: 0.36 K/UL — SIGNIFICANT CHANGE UP (ref 0–0.9)
MONOCYTES NFR BLD AUTO: 5.1 % — SIGNIFICANT CHANGE UP (ref 2–14)
NEUTROPHILS # BLD AUTO: 3.94 K/UL — SIGNIFICANT CHANGE UP (ref 1.8–7.4)
NEUTROPHILS NFR BLD AUTO: 55.9 % — SIGNIFICANT CHANGE UP (ref 43–77)
PLATELET # BLD AUTO: 167 K/UL — SIGNIFICANT CHANGE UP (ref 150–400)
POTASSIUM SERPL-MCNC: 4 MMOL/L — SIGNIFICANT CHANGE UP (ref 3.5–5.3)
POTASSIUM SERPL-SCNC: 4 MMOL/L — SIGNIFICANT CHANGE UP (ref 3.5–5.3)
PROT SERPL-MCNC: 7.4 G/DL — SIGNIFICANT CHANGE UP (ref 6.6–8.7)
RBC # BLD: 4.16 M/UL — SIGNIFICANT CHANGE UP (ref 3.8–5.2)
RBC # FLD: 14.6 % — HIGH (ref 10.3–14.5)
SODIUM SERPL-SCNC: 141 MMOL/L — SIGNIFICANT CHANGE UP (ref 135–145)
WBC # BLD: 7.06 K/UL — SIGNIFICANT CHANGE UP (ref 3.8–10.5)
WBC # FLD AUTO: 7.06 K/UL — SIGNIFICANT CHANGE UP (ref 3.8–10.5)

## 2021-06-07 PROCEDURE — 99285 EMERGENCY DEPT VISIT HI MDM: CPT

## 2021-06-07 NOTE — ED STATDOCS - NSFOLLOWUPCLINICS_GEN_ALL_ED_FT
[facility] - [place]  Dermatology  [address] [place] [id number]  Phone: [phone]  Fax: [phone]

## 2021-06-07 NOTE — ED STATDOCS - CLINICAL SUMMARY MEDICAL DECISION MAKING FREE TEXT BOX
Patient with  right eye swelling x3 days ago, started with small bump. Will get blood work, CAT scan to rule out orbital cellulitis.

## 2021-06-07 NOTE — ED STATDOCS - OBJECTIVE STATEMENT
81 y/o female with PMHx HLD, HTN, and Hypothyroid of presents to the ED c/o abscess. Patient reports of developing swelling and a bump to right lower eye and cheek area.  Patient also reports that her eye hurts with movement.     Allergic to Penicillin causes fainting 79 y/o female with PMHx HLD, HTN, and Hypothyroid of presents to the ED c/o abscess. Patient reports of developing swelling and a bump to right lower eye and cheek area x 1 day.  Patient also reports that her eye hurts with movement.     Allergic to Penicillin causes fainting

## 2021-06-07 NOTE — ED STATDOCS - CARE PROVIDER_API CALL
Remy Alcaraz)  Ophthalmology  [address], [address]  [place] [address]  Phone: [phone]  Fax: [phone]  Follow Up Time:     Pipo Giles)  Otolaryngology  [address] [place] [address], [address]  [place] [address]  Phone: [phone]  Fax: [phone]  Follow Up Time:

## 2021-06-07 NOTE — ED STATDOCS - NSFOLLOWUPINSTRUCTIONS_ED_ALL_ED_FT
- Prescription sent to pharmacy.  - Please call tomorrow to schedule follow up appointment with   - Please bring all documentation from your ED visit to any related future follow up appointment.  - Please call to schedule follow up appointment with your primary care physician within 24-48 hours.  - Please seek immediate medical attention for any new/worsening, signs/symptoms, or concerns.    Feel better!         Cellulitis    WHAT YOU NEED TO KNOW:    What is cellulitis? Cellulitis is a skin infection caused by bacteria. Cellulitis is common and can become severe. Cellulitis usually appears on the lower legs. It can also appear on the arms, face, and other areas. Cellulitis develops when bacteria enter a crack or break in your skin, such as a scratch, bite, or cut.    Cellulitis          What are the signs and symptoms of cellulitis? Signs and symptoms usually appear on one side of your body. You may have any of the following:  •A fever      •A red, warm, swollen area on your skin      •Pain when the area is touched      •Red spots, bumps, or blisters that may drain pus      •Bumpy, raised skin that feels like an orange peel      How is cellulitis diagnosed? Your healthcare provider may know you have cellulitis by looking at your skin. You may need blood tests to show what kind of bacteria are causing your infection. Other tests may be needed to see how much the infection has spread.    How is cellulitis treated? You should start to see improvement in 3 days. If your cellulitis is severe, you may need IV antibiotics in the hospital. If cellulitis is not treated, the infection can spread through your body and become life-threatening. You may need any of the following medicines:  •Antibiotics help treat the bacterial infection.      •Acetaminophen decreases pain and fever. It is available without a doctor's order. Ask how much to take and how often to take it. Follow directions. Read the labels of all other medicines you are using to see if they also contain acetaminophen, or ask your doctor or pharmacist. Acetaminophen can cause liver damage if not taken correctly. Do not use more than 4 grams (4,000 milligrams) total of acetaminophen in one day.       •NSAIDs, such as ibuprofen, help decrease swelling, pain, and fever. This medicine is available with or without a doctor's order. NSAIDs can cause stomach bleeding or kidney problems in certain people. If you take blood thinner medicine, always ask your healthcare provider if NSAIDs are safe for you. Always read the medicine label and follow directions.      How can I manage my symptoms?   •Wash the area with soap and water every day. Gently pat dry. Use bandages if directed by your healthcare provider.      •Elevate the area above the level of your heart as often as you can. This will help decrease swelling and pain. Prop the area on pillows or blankets to keep it elevated comfortably.  Elevate Leg           •Place a cool, damp cloth on the area. Use clean cloths and clean water. You can do this as often as you need to. Cool, damp cloths may help decrease pain.      •Apply cream or ointment as directed. These help protect the area. Most over-the-counter products, such as petroleum jelly, are good to use. Ask your healthcare provider about specific creams or ointments you should use.      How can I help prevent cellulitis?   •Do not scratch bug bites or areas of injury. You increase your risk for cellulitis by scratching these areas.      •Do not share personal items, such as towels, clothing, and razors.      •Clean exercise equipment with germ-killing  before and after you use it.      •Treat athlete’s foot. This can help prevent the spread of a bacterial skin infection.      •Wash your hands often. Use soap and water. Wash your hands after you use the bathroom, change a child's diapers, or sneeze. Wash your hands before you prepare or eat food. Use lotion to prevent dry, cracked skin.  Handwashing           When should I seek immediate care?   •Your wound gets larger and more painful.      •You feel a crackling under your skin when you touch it.      •You have purple dots or bumps on your skin, or you see bleeding under your skin.      •You see red streaks coming from the infected area.      When should I call my doctor?   •The red, warm, swollen area gets larger.      •Your fever or pain does not go away or gets worse.      •The area does not get smaller after 3 days of antibiotics.      •You have questions or concerns about your condition or care.      CARE AGREEMENT:    You have the right to help plan your care. Learn about your health condition and how it may be treated. Discuss treatment options with your healthcare providers to decide what care you want to receive. You always have the right to refuse treatment.

## 2021-06-07 NOTE — ED STATDOCS - NS ED ROS FT
Review of Systems  •	CONSTITUTIONAL - no  fever, no diaphoresis, no weight change  •	SKIN - no rash  •	HEMATOLOGIC - no bleeding, no bruising  •	EYES - (+) eye pain, no blurred vision (+) eye swelling  •	ENT - no change in hearing, no pain  •	RESPIRATORY - no shortness of breath, no cough  •	CARDIAC - no chest pain, no palpitations  •	GI - no abd pain, no nausea, no vomiting, no diarrhea, no constipation, no bleeding  •	GENITO-URINARY - no discharge, no dysuria; no hematuria,   •	ENDO - no polydipsia, no polyuria, no heat/no cold intolerance  •	MUSCULOSKELETAL - no joint pain, no swelling, no redness  •	NEUROLOGIC - no weakness, no headache, no anesthesia, no paresthesias  •	PSYCH - no anxiety, non suicidal, non homicidal, no hallucination, no depression

## 2021-06-07 NOTE — ED ADULT NURSE NOTE - OBJECTIVE STATEMENT
A&Ox4, RR even and unlabored. Skin is warm and dry.  PT c/O right eye swelling and pain.  PT states she has had an bump to the eye for a couple of months but noticed recently it is getting bigger in size and becoming painful.  Denies visual disturbances or discharge.

## 2021-06-07 NOTE — ED STATDOCS - PATIENT PORTAL LINK FT
You can access the FollowMyHealth Patient Portal offered by [facility] by registering at the following website: [email or web address]. By joining [employer]’s FollowMyHealth portal, you will also be able to view your health information using other applications (apps) compatible with our system.

## 2021-06-07 NOTE — ED STATDOCS - ATTENDING CONTRIBUTION TO CARE
I, Jose Mendoza, performed the initial face to face bedside interview with this patient regarding history of present illness, review of symptoms and relevant past medical, social and family history.  I completed an independent physical examination.  I was the initial provider who evaluated this patient. I have signed out the follow up of any pending tests (i.e. labs, radiological studies) to the ACP.  I have communicated the patient’s plan of care and disposition with the ACP.

## 2021-06-07 NOTE — ED ADULT TRIAGE NOTE - CHIEF COMPLAINT QUOTE
C/o abscess to right side of nose, states that she has had it for years but has noticed swelling around eyes and pain. Describes pain as throbbing. Denies any drainage.

## 2021-06-07 NOTE — ED STATDOCS - PHYSICAL EXAMINATION
VITAL SIGNS: I have reviewed nursing notes and confirm.  CONSTITUTIONAL: Well-developed; well-nourished; in no acute distress.  SKIN: Skin exam is warm and dry, no acute rash.  HEAD: Normocephalic; atraumatic.  EYES: PERRL, EOM intact; conjunctiva and sclera clear. Paraorbital swelling mostly in th lower eyelid, small bump over the medial nose   ENT: No nasal discharge; airway clear. Throat clear.  NECK: Supple; non tender.    CARD: S1, S2 normal; Regular rate and rhythm.  RESP: No wheezes,  no rales or rhonchi.   ABD:  soft; non-distended; non-tender;   EXT: Normal ROM. No clubbing, cyanosis or edema.  NEURO: Alert, oriented. Grossly unremarkable. No focal deficits. no facial droop, moves all extremities,  normal gait   PSYCH: Cooperative, appropriate. VITAL SIGNS: I have reviewed nursing notes and confirm.  CONSTITUTIONAL: Well-developed; well-nourished; in no acute distress.  SKIN: Skin exam is warm and dry, no acute rash.  HEAD: Normocephalic; atraumatic.  EYES: PERRL, EOM intact; conjunctiva and sclera clear. (+) Paraorbital swelling mostly in th lower eyelid, small bump over the medial nose   ENT: No nasal discharge; airway clear. Throat clear.  NECK: Supple; non tender.    CARD: S1, S2 normal; Regular rate and rhythm.  RESP: No wheezes,  no rales or rhonchi.   ABD:  soft; non-distended; non-tender;   EXT: Normal ROM. No clubbing, cyanosis or edema.  NEURO: Alert, oriented. Grossly unremarkable. No focal deficits. no facial droop, moves all extremities,  normal gait   PSYCH: Cooperative, appropriate.

## 2021-06-08 PROCEDURE — G1004: CPT

## 2021-06-08 PROCEDURE — 85025 COMPLETE CBC W/AUTO DIFF WBC: CPT

## 2021-06-08 PROCEDURE — 36415 COLL VENOUS BLD VENIPUNCTURE: CPT

## 2021-06-08 PROCEDURE — 80053 COMPREHEN METABOLIC PANEL: CPT

## 2021-06-08 PROCEDURE — 70481 CT ORBIT/EAR/FOSSA W/DYE: CPT | Mod: 26,ME

## 2021-06-08 PROCEDURE — 99284 EMERGENCY DEPT VISIT MOD MDM: CPT | Mod: 25

## 2021-06-08 PROCEDURE — 70481 CT ORBIT/EAR/FOSSA W/DYE: CPT

## 2021-06-08 RX ORDER — CEPHALEXIN 500 MG
1 CAPSULE ORAL
Qty: 28 | Refills: 0
Start: 2021-06-08 | End: 2021-06-14

## 2021-06-08 RX ORDER — CEPHALEXIN 500 MG
500 CAPSULE ORAL ONCE
Refills: 0 | Status: COMPLETED | OUTPATIENT
Start: 2021-06-08 | End: 2021-06-08

## 2021-06-08 RX ADMIN — Medication 500 MILLIGRAM(S): at 03:01

## 2022-01-12 ENCOUNTER — APPOINTMENT (OUTPATIENT)
Dept: INTERNAL MEDICINE | Facility: CLINIC | Age: 82
End: 2022-01-12

## 2022-08-29 NOTE — PATIENT PROFILE ADULT - NSPROMEDSPUMP_GEN_A_NUR
Called patient to schedule a follow up appt. Spoke with patient's daughter Juliet and set up an appointment for 9/20.  Mirna Perez, Pharm.D, [facility]  Medication Therapy Management Pharmacist    
none

## 2022-12-16 ENCOUNTER — OFFICE (OUTPATIENT)
Dept: URBAN - METROPOLITAN AREA CLINIC 6 | Facility: CLINIC | Age: 82
Setting detail: OPHTHALMOLOGY
End: 2022-12-16
Payer: COMMERCIAL

## 2022-12-16 DIAGNOSIS — H43.393: ICD-10-CM

## 2022-12-16 DIAGNOSIS — H01.001: ICD-10-CM

## 2022-12-16 DIAGNOSIS — H01.002: ICD-10-CM

## 2022-12-16 DIAGNOSIS — H01.004: ICD-10-CM

## 2022-12-16 DIAGNOSIS — H01.005: ICD-10-CM

## 2022-12-16 DIAGNOSIS — Z96.1: ICD-10-CM

## 2022-12-16 PROCEDURE — 92201 OPSCPY EXTND RTA DRAW UNI/BI: CPT | Performed by: OPHTHALMOLOGY

## 2022-12-16 PROCEDURE — 92014 COMPRE OPH EXAM EST PT 1/>: CPT | Performed by: OPHTHALMOLOGY

## 2022-12-16 ASSESSMENT — REFRACTION_CURRENTRX
OD_OVR_VA: 20/
OD_AXIS: 120
OS_SPHERE: +3.00
OD_SPHERE: +2.50
OD_CYLINDER: -0.75
OS_VPRISM_DIRECTION: PROGS
OS_OVR_VA: 20/
OS_ADD: +2.75
OD_VPRISM_DIRECTION: PROGS
OD_ADD: +2.75
OS_CYLINDER: -1.00
OS_AXIS: 85

## 2022-12-16 ASSESSMENT — AXIALLENGTH_DERIVED
OS_AL: 22.4141
OD_AL: 22.4192
OS_AL: 22.2397

## 2022-12-16 ASSESSMENT — KERATOMETRY
OS_K2POWER_DIOPTERS: 47.50
METHOD_AUTO_MANUAL: AUTO
OD_AXISANGLE_DEGREES: 095
OD_K2POWER_DIOPTERS: 48.00
OS_K1POWER_DIOPTERS: 46.25
OS_AXISANGLE_DEGREES: 058
OD_K1POWER_DIOPTERS: 46.25

## 2022-12-16 ASSESSMENT — CONFRONTATIONAL VISUAL FIELD TEST (CVF)
OS_FINDINGS: FULL
OD_FINDINGS: FULL

## 2022-12-16 ASSESSMENT — REFRACTION_AUTOREFRACTION
OS_SPHERE: +0.25
OS_AXIS: 004
OD_CYLINDER: -1.00
OS_CYLINDER: -0.50
OD_SPHERE: +0.25
OD_AXIS: 002

## 2022-12-16 ASSESSMENT — TONOMETRY: OS_IOP_MMHG: 10

## 2022-12-16 ASSESSMENT — REFRACTION_MANIFEST
OS_ADD: +2.50
OS_CYLINDER: -0.50
OS_VA1: 20/25-1
OD_AXIS: 110
OD_VA1: 20/30-2
OD_CYLINDER: -0.50
OS_SPHERE: +0.75
OS_AXIS: 025
OD_ADD: +2.50
OD_SPHERE: PLANO

## 2022-12-16 ASSESSMENT — LID EXAM ASSESSMENTS
OD_BLEPHARITIS: RLL RUL
OS_BLEPHARITIS: LLL LUL

## 2022-12-16 ASSESSMENT — SPHEQUIV_DERIVED
OD_SPHEQUIV: -0.25
OS_SPHEQUIV: 0.5
OS_SPHEQUIV: 0

## 2022-12-16 ASSESSMENT — VISUAL ACUITY
OS_BCVA: 20/20-2
OD_BCVA: 20/25-2

## 2022-12-16 ASSESSMENT — CORNEAL EDEMA - FOLDS/STRIAE: OS_FOLDSSTRIAE: T

## 2024-03-14 NOTE — ED ADULT TRIAGE NOTE - SOURCE OF INFORMATION
Discussed results of biopsy with patient:    A.  Skin, midline upper chest, shave biopsy:  -Seborrheic keratosis, inflamed.     B. Skin, right upper medial chest, shave biopsy:  -Seborrheic keratosis, inflamed.    Discussed benign nature of diagnosis and reassured.   
Patient

## 2024-05-09 ENCOUNTER — APPOINTMENT (OUTPATIENT)
Dept: HOME HEALTH SERVICES | Facility: HOME HEALTH | Age: 84
End: 2024-05-09
Payer: MEDICARE

## 2024-05-09 VITALS
RESPIRATION RATE: 16 BRPM | DIASTOLIC BLOOD PRESSURE: 70 MMHG | TEMPERATURE: 36.7 F | BODY MASS INDEX: 24.59 KG/M2 | WEIGHT: 144 LBS | OXYGEN SATURATION: 98 % | HEART RATE: 68 BPM | HEIGHT: 64 IN | SYSTOLIC BLOOD PRESSURE: 118 MMHG

## 2024-05-09 DIAGNOSIS — Z86.79 PERSONAL HISTORY OF OTHER DISEASES OF THE CIRCULATORY SYSTEM: ICD-10-CM

## 2024-05-09 DIAGNOSIS — I10 ESSENTIAL (PRIMARY) HYPERTENSION: ICD-10-CM

## 2024-05-09 DIAGNOSIS — Z86.39 PERSONAL HISTORY OF OTHER ENDOCRINE, NUTRITIONAL AND METABOLIC DISEASE: ICD-10-CM

## 2024-05-09 DIAGNOSIS — E78.5 HYPERLIPIDEMIA, UNSPECIFIED: ICD-10-CM

## 2024-05-09 DIAGNOSIS — E03.9 HYPOTHYROIDISM, UNSPECIFIED: ICD-10-CM

## 2024-05-09 PROCEDURE — G0506: CPT

## 2024-05-09 PROCEDURE — 99342 HOME/RES VST NEW LOW MDM 30: CPT | Mod: 25

## 2024-05-09 RX ORDER — FOSINOPRIL SODIUM 40 MG/1
40 TABLET ORAL DAILY
Refills: 0 | Status: ACTIVE | COMMUNITY
Start: 2024-05-09

## 2024-05-09 RX ORDER — LEVOTHYROXINE SODIUM 0.07 MG/1
75 TABLET ORAL
Qty: 90 | Refills: 3 | Status: ACTIVE | COMMUNITY
Start: 2024-05-09

## 2024-05-09 RX ORDER — ATORVASTATIN CALCIUM 80 MG/1
80 TABLET, FILM COATED ORAL
Qty: 30 | Refills: 0 | Status: ACTIVE | COMMUNITY
Start: 2024-05-09

## 2024-05-09 RX ORDER — UBIDECARENONE 30 MG
CAPSULE ORAL DAILY
Refills: 0 | Status: ACTIVE | COMMUNITY
Start: 2024-05-09

## 2024-05-12 NOTE — COUNSELING
[Normal Weight - ( BMI  <25 )] : normal weight - ( BMI  <25 ) [Continue diet as tolerated] : continue diet as tolerated based on goals of care [Non - Smoker] : non-smoker [Smoke/CO Detectors] : smoke/CO detectors [] : foot exam [Completed] : Aspirin use discussion completed [ - New patient with 2 or more chronic conditions; CCM discussed and patient-centered care plan established] : New patient with 2 or more chronic conditions; CCM discussed and patient-centered care plan established [Improve exercise tolerance] : improve exercise tolerance [Improve mobility] : improve mobility [Decrease stress] : decrease stress [Decrease hospital use] : decrease hospital use [Minimize unnecessary interventions] : minimize unnecessary interventions [Patient/Caregiver not ready to engage in discussion] : patient/caregiver not ready to engage in discussion [Full Code] : Code Status: Full Code

## 2024-05-12 NOTE — PHYSICAL EXAM
[No Acute Distress] : no acute distress [Normal Voice/Communication] : normal voice communication [Normal Sclera/Conjunctiva] : normal sclera/conjunctiva [Normal Outer Ear/Nose] : the ears and nose were normal in appearance [No JVD] : no jugular venous distention [No Respiratory Distress] : no respiratory distress [Clear to Auscultation] : lungs were clear to auscultation bilaterally [No Accessory Muscle Use] : no accessory muscle use [Normal Rate] : heart rate was normal  [Regular Rhythm] : with a regular rhythm [Normal Bowel Sounds] : normal bowel sounds [Non Tender] : non-tender [Soft] : abdomen soft [No CVA Tenderness] : no ~M costovertebral angle tenderness [No Spinal Tenderness] : no spinal tenderness [Normal Gait] : normal gait [No Joint Swelling] : no joint swelling seen [No Rash] : no rash [No Skin Lesions] : no skin lesions [Oriented x3] : oriented to person, place, and time [de-identified] : Had knee surgery get Stiff in AM

## 2024-05-12 NOTE — REASON FOR VISIT
[Initial Evaluation] : an initial evaluation [Family Member] : family member [Pre-Visit Preparation] : pre-visit preparation was done [Intercurrent Specialty/Sub-specialty Visits] : the patient has intercurrent specialty/sub-specialty visits [FreeTextEntry3] : pcp

## 2024-05-12 NOTE — HISTORY OF PRESENT ILLNESS
[Patient] : patient [FreeTextEntry1] : n/a [FreeTextEntry2] : ESTELLA GAFFNEY is a 83 year female with PMH of HTN, HLD, Hypothyroid, David Knee replacement 2 diff surgery. ca 83 year old female being seen for an initial visit, Mrs. Gaffney is AOx3 and able to speak for herself. She was born in [place] but has lived in the [facility] for more 40 years. She is  and had 8 children, 1 . She is currently retired but worked in childcare. She lives in private home with her granddaughter and her eldest daughter who has dementia. She is fully independent with all ADLS and IADLS and ambulates independently  Interval Events Patient denies any complain, no pain , no chest pain, No sob, no cough.   Subjective: -Appetite/weight: appetite good, weight stable -Gait/falls: Gait steady, no falls -Pain: No pain -Sleep: Sleep well -BMs: Daily BM -Urine: No Issues -Skin: Intact -DME: None -Mood/memory: Mood Good, Memory Good -Communication: Conversational -Health Maintenance: UTD -Hospitalizations in the past year: 2024 Right Knee Surgery At [facility]    Medical Issues: HTN-On Fosinopril HLD- Atorvastatin Hypothyroid- On Synthroid Multi vit- On Supplement  Specialists: Dr Zoila Brown PCP  Social hx: Lives with daughter and granddaughter Caregivers: Self care MOLST: Not ready to discuss HCP: Ranjeet Man (GRandson)

## 2024-05-12 NOTE — HEALTH RISK ASSESSMENT
[HRA Reviewed] : Health risk assessment reviewed [Independent] : managing finances [No falls in past year] : Patient reported no falls in the past year [TimeGetUpGo] : 2

## 2024-09-09 ENCOUNTER — APPOINTMENT (OUTPATIENT)
Dept: HOME HEALTH SERVICES | Facility: HOME HEALTH | Age: 84
End: 2024-09-09

## 2024-12-02 ENCOUNTER — APPOINTMENT (OUTPATIENT)
Dept: HOME HEALTH SERVICES | Facility: HOME HEALTH | Age: 84
End: 2024-12-02
Payer: MEDICARE

## 2024-12-02 VITALS
SYSTOLIC BLOOD PRESSURE: 120 MMHG | OXYGEN SATURATION: 97 % | BODY MASS INDEX: 22.88 KG/M2 | WEIGHT: 134 LBS | DIASTOLIC BLOOD PRESSURE: 70 MMHG | TEMPERATURE: 98.06 F | RESPIRATION RATE: 18 BRPM | HEIGHT: 64 IN | HEART RATE: 74 BPM

## 2024-12-02 DIAGNOSIS — E78.5 HYPERLIPIDEMIA, UNSPECIFIED: ICD-10-CM

## 2024-12-02 DIAGNOSIS — E03.9 HYPOTHYROIDISM, UNSPECIFIED: ICD-10-CM

## 2024-12-02 DIAGNOSIS — I10 ESSENTIAL (PRIMARY) HYPERTENSION: ICD-10-CM

## 2024-12-02 PROCEDURE — 99349 HOME/RES VST EST MOD MDM 40: CPT

## 2025-01-08 ENCOUNTER — NON-APPOINTMENT (OUTPATIENT)
Age: 85
End: 2025-01-08

## 2025-02-10 ENCOUNTER — APPOINTMENT (OUTPATIENT)
Dept: HOME HEALTH SERVICES | Facility: HOME HEALTH | Age: 85
End: 2025-02-10

## 2025-02-10 VITALS — SYSTOLIC BLOOD PRESSURE: 130 MMHG | OXYGEN SATURATION: 98 % | HEART RATE: 76 BPM | DIASTOLIC BLOOD PRESSURE: 80 MMHG

## 2025-03-25 ENCOUNTER — APPOINTMENT (OUTPATIENT)
Dept: HOME HEALTH SERVICES | Facility: HOME HEALTH | Age: 85
End: 2025-03-25
Payer: MEDICARE

## 2025-03-25 VITALS
RESPIRATION RATE: 18 BRPM | HEART RATE: 68 BPM | TEMPERATURE: 97.1 F | DIASTOLIC BLOOD PRESSURE: 100 MMHG | SYSTOLIC BLOOD PRESSURE: 180 MMHG | OXYGEN SATURATION: 100 %

## 2025-03-25 DIAGNOSIS — E78.5 HYPERLIPIDEMIA, UNSPECIFIED: ICD-10-CM

## 2025-03-25 DIAGNOSIS — E03.9 HYPOTHYROIDISM, UNSPECIFIED: ICD-10-CM

## 2025-03-25 DIAGNOSIS — I10 ESSENTIAL (PRIMARY) HYPERTENSION: ICD-10-CM

## 2025-03-25 DIAGNOSIS — K21.9 GASTRO-ESOPHAGEAL REFLUX DISEASE W/OUT ESOPHAGITIS: ICD-10-CM

## 2025-03-25 PROCEDURE — G0439: CPT

## 2025-03-25 PROCEDURE — 99349 HOME/RES VST EST MOD MDM 40: CPT | Mod: 25

## 2025-03-25 RX ORDER — FAMOTIDINE 20 MG/1
20 TABLET, FILM COATED ORAL TWICE DAILY
Refills: 0 | Status: ACTIVE | COMMUNITY
Start: 2025-03-25

## 2025-06-02 ENCOUNTER — APPOINTMENT (OUTPATIENT)
Dept: HOME HEALTH SERVICES | Facility: HOME HEALTH | Age: 85
End: 2025-06-02